# Patient Record
Sex: FEMALE | Race: WHITE | NOT HISPANIC OR LATINO | URBAN - METROPOLITAN AREA
[De-identification: names, ages, dates, MRNs, and addresses within clinical notes are randomized per-mention and may not be internally consistent; named-entity substitution may affect disease eponyms.]

---

## 2018-06-15 ENCOUNTER — OUTPATIENT (OUTPATIENT)
Dept: OUTPATIENT SERVICES | Facility: HOSPITAL | Age: 48
LOS: 1 days | Discharge: HOME | End: 2018-06-15

## 2018-06-15 ENCOUNTER — RESULT REVIEW (OUTPATIENT)
Age: 48
End: 2018-06-15

## 2018-06-27 DIAGNOSIS — Z01.419 ENCOUNTER FOR GYNECOLOGICAL EXAMINATION (GENERAL) (ROUTINE) WITHOUT ABNORMAL FINDINGS: ICD-10-CM

## 2020-03-31 PROBLEM — Z00.00 ENCOUNTER FOR PREVENTIVE HEALTH EXAMINATION: Status: ACTIVE | Noted: 2020-03-31

## 2020-04-10 ENCOUNTER — APPOINTMENT (OUTPATIENT)
Dept: OBGYN | Facility: CLINIC | Age: 50
End: 2020-04-10

## 2020-08-14 ENCOUNTER — LABORATORY RESULT (OUTPATIENT)
Age: 50
End: 2020-08-14

## 2020-08-14 ENCOUNTER — APPOINTMENT (OUTPATIENT)
Dept: OBGYN | Facility: CLINIC | Age: 50
End: 2020-08-14
Payer: COMMERCIAL

## 2020-08-14 VITALS
HEIGHT: 63 IN | WEIGHT: 130 LBS | BODY MASS INDEX: 23.04 KG/M2 | SYSTOLIC BLOOD PRESSURE: 129 MMHG | DIASTOLIC BLOOD PRESSURE: 83 MMHG

## 2020-08-14 DIAGNOSIS — F17.200 NICOTINE DEPENDENCE, UNSPECIFIED, UNCOMPLICATED: ICD-10-CM

## 2020-08-14 DIAGNOSIS — N76.0 ACUTE VAGINITIS: ICD-10-CM

## 2020-08-14 PROCEDURE — 99396 PREV VISIT EST AGE 40-64: CPT

## 2020-08-25 LAB
A VAGINAE DNA VAG QL NAA+PROBE: NORMAL
BVAB2 DNA VAG QL NAA+PROBE: ABNORMAL
C KRUSEI DNA VAG QL NAA+PROBE: NEGATIVE
C TRACH RRNA SPEC QL NAA+PROBE: NEGATIVE
HPV HIGH+LOW RISK DNA PNL CVX: DETECTED
MEGA1 DNA VAG QL NAA+PROBE: NORMAL
N GONORRHOEA RRNA SPEC QL NAA+PROBE: NEGATIVE
T VAGINALIS RRNA SPEC QL NAA+PROBE: NEGATIVE

## 2020-08-27 ENCOUNTER — TRANSCRIPTION ENCOUNTER (OUTPATIENT)
Age: 50
End: 2020-08-27

## 2020-12-23 PROBLEM — N76.0 VAGINOSIS: Status: RESOLVED | Noted: 2020-08-14 | Resolved: 2020-12-23

## 2021-12-17 ENCOUNTER — APPOINTMENT (OUTPATIENT)
Dept: OBGYN | Facility: CLINIC | Age: 51
End: 2021-12-17

## 2022-01-18 ENCOUNTER — INPATIENT (INPATIENT)
Facility: HOSPITAL | Age: 52
LOS: 1 days | Discharge: HOME | End: 2022-01-20
Attending: STUDENT IN AN ORGANIZED HEALTH CARE EDUCATION/TRAINING PROGRAM | Admitting: STUDENT IN AN ORGANIZED HEALTH CARE EDUCATION/TRAINING PROGRAM
Payer: COMMERCIAL

## 2022-01-18 VITALS
TEMPERATURE: 98 F | WEIGHT: 136.91 LBS | DIASTOLIC BLOOD PRESSURE: 80 MMHG | HEART RATE: 73 BPM | HEIGHT: 63 IN | SYSTOLIC BLOOD PRESSURE: 136 MMHG | RESPIRATION RATE: 18 BRPM | OXYGEN SATURATION: 100 %

## 2022-01-18 DIAGNOSIS — Z98.890 OTHER SPECIFIED POSTPROCEDURAL STATES: Chronic | ICD-10-CM

## 2022-01-18 LAB
ALBUMIN SERPL ELPH-MCNC: 4.4 G/DL — SIGNIFICANT CHANGE UP (ref 3.5–5.2)
ALP SERPL-CCNC: 44 U/L — SIGNIFICANT CHANGE UP (ref 30–115)
ALT FLD-CCNC: 11 U/L — SIGNIFICANT CHANGE UP (ref 0–41)
ANION GAP SERPL CALC-SCNC: 13 MMOL/L — SIGNIFICANT CHANGE UP (ref 7–14)
APTT BLD: 31.8 SEC — SIGNIFICANT CHANGE UP (ref 27–39.2)
AST SERPL-CCNC: 21 U/L — SIGNIFICANT CHANGE UP (ref 0–41)
BASOPHILS # BLD AUTO: 0.04 K/UL — SIGNIFICANT CHANGE UP (ref 0–0.2)
BASOPHILS NFR BLD AUTO: 0.8 % — SIGNIFICANT CHANGE UP (ref 0–1)
BILIRUB SERPL-MCNC: 0.3 MG/DL — SIGNIFICANT CHANGE UP (ref 0.2–1.2)
BLD GP AB SCN SERPL QL: SIGNIFICANT CHANGE UP
BUN SERPL-MCNC: 7 MG/DL — LOW (ref 10–20)
CALCIUM SERPL-MCNC: 8.9 MG/DL — SIGNIFICANT CHANGE UP (ref 8.5–10.1)
CHLORIDE SERPL-SCNC: 101 MMOL/L — SIGNIFICANT CHANGE UP (ref 98–110)
CO2 SERPL-SCNC: 23 MMOL/L — SIGNIFICANT CHANGE UP (ref 17–32)
CREAT SERPL-MCNC: 0.8 MG/DL — SIGNIFICANT CHANGE UP (ref 0.7–1.5)
EOSINOPHIL # BLD AUTO: 0.17 K/UL — SIGNIFICANT CHANGE UP (ref 0–0.7)
EOSINOPHIL NFR BLD AUTO: 3.5 % — SIGNIFICANT CHANGE UP (ref 0–8)
GLUCOSE SERPL-MCNC: 90 MG/DL — SIGNIFICANT CHANGE UP (ref 70–99)
HCG SERPL QL: NEGATIVE — SIGNIFICANT CHANGE UP
HCT VFR BLD CALC: 35.5 % — LOW (ref 37–47)
HGB BLD-MCNC: 11.7 G/DL — LOW (ref 12–16)
IMM GRANULOCYTES NFR BLD AUTO: 0.2 % — SIGNIFICANT CHANGE UP (ref 0.1–0.3)
INR BLD: 1.11 RATIO — SIGNIFICANT CHANGE UP (ref 0.65–1.3)
LIDOCAIN IGE QN: 23 U/L — SIGNIFICANT CHANGE UP (ref 7–60)
LYMPHOCYTES # BLD AUTO: 1.87 K/UL — SIGNIFICANT CHANGE UP (ref 1.2–3.4)
LYMPHOCYTES # BLD AUTO: 38.6 % — SIGNIFICANT CHANGE UP (ref 20.5–51.1)
MAGNESIUM SERPL-MCNC: 1.8 MG/DL — SIGNIFICANT CHANGE UP (ref 1.8–2.4)
MCHC RBC-ENTMCNC: 27.1 PG — SIGNIFICANT CHANGE UP (ref 27–31)
MCHC RBC-ENTMCNC: 33 G/DL — SIGNIFICANT CHANGE UP (ref 32–37)
MCV RBC AUTO: 82.4 FL — SIGNIFICANT CHANGE UP (ref 81–99)
MONOCYTES # BLD AUTO: 0.36 K/UL — SIGNIFICANT CHANGE UP (ref 0.1–0.6)
MONOCYTES NFR BLD AUTO: 7.4 % — SIGNIFICANT CHANGE UP (ref 1.7–9.3)
NEUTROPHILS # BLD AUTO: 2.39 K/UL — SIGNIFICANT CHANGE UP (ref 1.4–6.5)
NEUTROPHILS NFR BLD AUTO: 49.5 % — SIGNIFICANT CHANGE UP (ref 42.2–75.2)
NRBC # BLD: 0 /100 WBCS — SIGNIFICANT CHANGE UP (ref 0–0)
NT-PROBNP SERPL-SCNC: 47 PG/ML — SIGNIFICANT CHANGE UP (ref 0–300)
PLATELET # BLD AUTO: 293 K/UL — SIGNIFICANT CHANGE UP (ref 130–400)
POTASSIUM SERPL-MCNC: 3.7 MMOL/L — SIGNIFICANT CHANGE UP (ref 3.5–5)
POTASSIUM SERPL-SCNC: 3.7 MMOL/L — SIGNIFICANT CHANGE UP (ref 3.5–5)
PROT SERPL-MCNC: 7 G/DL — SIGNIFICANT CHANGE UP (ref 6–8)
PROTHROM AB SERPL-ACNC: 12.7 SEC — SIGNIFICANT CHANGE UP (ref 9.95–12.87)
RBC # BLD: 4.31 M/UL — SIGNIFICANT CHANGE UP (ref 4.2–5.4)
RBC # FLD: 15.7 % — HIGH (ref 11.5–14.5)
SARS-COV-2 RNA SPEC QL NAA+PROBE: SIGNIFICANT CHANGE UP
SODIUM SERPL-SCNC: 137 MMOL/L — SIGNIFICANT CHANGE UP (ref 135–146)
TROPONIN T SERPL-MCNC: 0.1 NG/ML — CRITICAL HIGH
TROPONIN T SERPL-MCNC: <0.01 NG/ML — SIGNIFICANT CHANGE UP
WBC # BLD: 4.84 K/UL — SIGNIFICANT CHANGE UP (ref 4.8–10.8)
WBC # FLD AUTO: 4.84 K/UL — SIGNIFICANT CHANGE UP (ref 4.8–10.8)

## 2022-01-18 PROCEDURE — 99291 CRITICAL CARE FIRST HOUR: CPT

## 2022-01-18 PROCEDURE — 93010 ELECTROCARDIOGRAM REPORT: CPT | Mod: 77

## 2022-01-18 PROCEDURE — 99222 1ST HOSP IP/OBS MODERATE 55: CPT

## 2022-01-18 PROCEDURE — 93010 ELECTROCARDIOGRAM REPORT: CPT | Mod: 76

## 2022-01-18 PROCEDURE — 71045 X-RAY EXAM CHEST 1 VIEW: CPT | Mod: 26

## 2022-01-18 RX ORDER — ATORVASTATIN CALCIUM 80 MG/1
80 TABLET, FILM COATED ORAL AT BEDTIME
Refills: 0 | Status: DISCONTINUED | OUTPATIENT
Start: 2022-01-18 | End: 2022-01-20

## 2022-01-18 RX ORDER — MORPHINE SULFATE 50 MG/1
2 CAPSULE, EXTENDED RELEASE ORAL ONCE
Refills: 0 | Status: DISCONTINUED | OUTPATIENT
Start: 2022-01-18 | End: 2022-01-18

## 2022-01-18 RX ORDER — ASPIRIN/CALCIUM CARB/MAGNESIUM 324 MG
81 TABLET ORAL DAILY
Refills: 0 | Status: DISCONTINUED | OUTPATIENT
Start: 2022-01-19 | End: 2022-01-20

## 2022-01-18 RX ORDER — HEPARIN SODIUM 5000 [USP'U]/ML
800 INJECTION INTRAVENOUS; SUBCUTANEOUS
Qty: 25000 | Refills: 0 | Status: DISCONTINUED | OUTPATIENT
Start: 2022-01-18 | End: 2022-01-19

## 2022-01-18 RX ORDER — HEPARIN SODIUM 5000 [USP'U]/ML
INJECTION INTRAVENOUS; SUBCUTANEOUS
Qty: 25000 | Refills: 0 | Status: DISCONTINUED | OUTPATIENT
Start: 2022-01-18 | End: 2022-01-18

## 2022-01-18 RX ORDER — ACETAMINOPHEN 500 MG
650 TABLET ORAL EVERY 6 HOURS
Refills: 0 | Status: DISCONTINUED | OUTPATIENT
Start: 2022-01-18 | End: 2022-01-20

## 2022-01-18 RX ORDER — ONDANSETRON 8 MG/1
4 TABLET, FILM COATED ORAL EVERY 8 HOURS
Refills: 0 | Status: DISCONTINUED | OUTPATIENT
Start: 2022-01-18 | End: 2022-01-20

## 2022-01-18 RX ORDER — NITROGLYCERIN 6.5 MG
5 CAPSULE, EXTENDED RELEASE ORAL
Qty: 50 | Refills: 0 | Status: DISCONTINUED | OUTPATIENT
Start: 2022-01-18 | End: 2022-01-19

## 2022-01-18 RX ORDER — ASPIRIN/CALCIUM CARB/MAGNESIUM 324 MG
324 TABLET ORAL ONCE
Refills: 0 | Status: COMPLETED | OUTPATIENT
Start: 2022-01-18 | End: 2022-01-18

## 2022-01-18 RX ORDER — LANOLIN ALCOHOL/MO/W.PET/CERES
3 CREAM (GRAM) TOPICAL AT BEDTIME
Refills: 0 | Status: DISCONTINUED | OUTPATIENT
Start: 2022-01-18 | End: 2022-01-20

## 2022-01-18 RX ORDER — CLOPIDOGREL BISULFATE 75 MG/1
300 TABLET, FILM COATED ORAL ONCE
Refills: 0 | Status: DISCONTINUED | OUTPATIENT
Start: 2022-01-18 | End: 2022-01-18

## 2022-01-18 RX ORDER — CLOPIDOGREL BISULFATE 75 MG/1
300 TABLET, FILM COATED ORAL ONCE
Refills: 0 | Status: COMPLETED | OUTPATIENT
Start: 2022-01-18 | End: 2022-01-18

## 2022-01-18 RX ADMIN — Medication 650 MILLIGRAM(S): at 21:48

## 2022-01-18 RX ADMIN — HEPARIN SODIUM 8 UNIT(S)/HR: 5000 INJECTION INTRAVENOUS; SUBCUTANEOUS at 17:38

## 2022-01-18 RX ADMIN — Medication 324 MILLIGRAM(S): at 09:12

## 2022-01-18 RX ADMIN — Medication 1.5 MICROGRAM(S)/MIN: at 21:14

## 2022-01-18 RX ADMIN — ATORVASTATIN CALCIUM 80 MILLIGRAM(S): 80 TABLET, FILM COATED ORAL at 21:48

## 2022-01-18 RX ADMIN — MORPHINE SULFATE 2 MILLIGRAM(S): 50 CAPSULE, EXTENDED RELEASE ORAL at 14:25

## 2022-01-18 RX ADMIN — Medication 1.5 MICROGRAM(S)/MIN: at 16:00

## 2022-01-18 RX ADMIN — MORPHINE SULFATE 2 MILLIGRAM(S): 50 CAPSULE, EXTENDED RELEASE ORAL at 21:14

## 2022-01-18 RX ADMIN — CLOPIDOGREL BISULFATE 300 MILLIGRAM(S): 75 TABLET, FILM COATED ORAL at 17:14

## 2022-01-18 NOTE — ED ADULT NURSE REASSESSMENT NOTE - NS ED NURSE REASSESS COMMENT FT1
Pt. en route to [facility] ED via EMS, nitro drip infusing. MDs Reyes/Randolph aware. Heparin held, pending results as per MD. Charge SHANNAN Dawn aware.

## 2022-01-18 NOTE — ED PROVIDER NOTE - CLINICAL SUMMARY MEDICAL DECISION MAKING FREE TEXT BOX
51-year-old female with past medical history of migraines presents the ER for chest pain that started this morning.  She describes pain as pressure, radiating up to her left neck and left arm. Initial EKG with concave 1mm DEUQAN in inferior leads without reciprocal changes. CP resolved with aspirin. On reassessment chest pain returned, cardiology consulted. Spoke with Dr. Adkins who recommended eval by [facility] at [place]. Patient placed on nitro, heparin drip and transferred to [facility]. 51-year-old female with past medical history of migraines presents the ER for chest pain that started this morning.  She describes pain as pressure, radiating up to her left neck and left arm. Initial EKG with concave 1mm DEQUAN in inferior leads without reciprocal changes. CP resolved with aspirin. On reassessment chest pain returned, cardiology consulted. Spoke with Dr. Adkins who recommended eval by [facility] at [place]. Nitro drip, heparin drip hanging and transferred to [facility]. 51-year-old female with past medical history of migraines presents the ER for chest pain that started this morning.  She describes pain as pressure, radiating up to her left neck and left arm. Initial EKG with concave 1mm DEQUAN in inferior leads without reciprocal changes. CP resolved with aspirin. On reassessment chest pain returned, cardiology consulted. Spoke with Dr. Adkins who recommended eval by [facility] at [place]. Nitro drip, heparin drip hanging and transferred to [facility] ER.      Patrick Fernandez Jr addendum [id number]: pt arrived at [facility] ER. seen by CCU fellow. no cp now. mild L jaw pain. on nitro gtt. ekg with no stemi. ctab, heart reg no mrg, nontoxic. given asa at [facility]. cards rec heparin gtt, nitro gtt, plavix 300mg. accepted and admitted to ccu.

## 2022-01-18 NOTE — ED PROVIDER NOTE - PROGRESS NOTE DETAILS
[facility] - EKG improving, pt has no active chest pain; will tx to [facility] for curther cardiac workup, discussed with pt, agrees AH - EKG improving, pt has no active chest pain; will tx to [facility] for further cardiac workup, discussed with pt, agrees JR: Patient reassessed at the bedside.  She endorses 3/10 chest pain, pressure-like unrelieved with aspirin.  Repeat troponin is elevated and EKG is nonischemic.  Will consult cardiology and recommend cardiac catheterization. JR: Discussed case with cardiology Dr. Adkins who agrees to consult cardio fellow at [place] given patient likely needs an urgent cardiac catheterization. AH - Initial EKG showed minimal 1mm DEQUAN in inferior leads, without reciprocal changes or TWI, rpt EKG improved, pt has no active chest pain; AH - pt was endorsing chest pressure, 3/10; rpt Trop 0.10.  Rpt EKG unchanged, no ischemic changes; Cardiology consulted; Spoke to Lucille who agrees with Cardio fellow consult for cath at [place] [facility] - spoke with Cardio Fellow, stated he accepts transfer to [place] with Aspirin, heparin  and nitro drip, no dual antiplatelet at this time; will see pt at [place] ED; pt aware, VSS AH - Nitro on standby with EMS at 5 mcg/min since pt not having active chest pain at this time;  Heparin infusion on standby, pending PTT, in lab

## 2022-01-18 NOTE — H&P ADULT - NSHPLABSRESULTS_GEN_ALL_CORE
< from: Xray Chest 1 View- PORTABLE-Urgent (01.18.22 @ 09:12) >      Impression:    No radiographic evidence of acute cardiopulmonary disease.      < end of copied text >

## 2022-01-18 NOTE — H&P ADULT - ATTENDING COMMENTS
51 year old female patient with no significant past cardiac hx presented to hospital for chest pain with EKG changes, trop was positive admitted to CCU and treated as ACS initially underwent cardiac cath earlier today showed non obstructive CAD and suspicion of AV fistula     Plan:  -Will do CCTA   -Continue ASA 81 mg daily   -will hold on plavix and heparin drip   -echo pending

## 2022-01-18 NOTE — PATIENT PROFILE ADULT - FALL HARM RISK - UNIVERSAL INTERVENTIONS
Bed in lowest position, wheels locked, appropriate side rails in place/Call bell, personal items and telephone in reach/Instruct patient to call for assistance before getting out of bed or chair/Non-slip footwear when patient is out of bed/[place] to call system/Physically safe environment - no spills, clutter or unnecessary equipment/Purposeful Proactive Rounding/Room/bathroom lighting operational, light cord in reach

## 2022-01-18 NOTE — ED PROVIDER NOTE - PHYSICAL EXAMINATION
CONSTITUTIONAL: Well-developed; well-nourished; in no acute distress, afebrile  EYES: No conjunctival injection. PERRLA. EOMI  ENT: No nasal discharge; oropharynx nonerythematous; airway clear  NECK: Supple; non tender; No JVD  CARD:  Regular rate and rhythm; S1, S2 normal; no murmurs, gallops, or rubs; no chest wall tenderness  RESP: CTAB  ABD: Soft NTND; No guarding or rebound tenderness  EXT: Normal ROM.  No clubbing or cyanosis.  No edema  NEURO: A&O x3, grossly unremarkable, no focal deficits  PSYCH: Cooperative, appropriate  *Chaperone was used during the encounter

## 2022-01-18 NOTE — ED PROVIDER NOTE - ATTENDING CONTRIBUTION TO CARE
Got 51-year-old female with past medical history of migraines presents the ER for chest pain that started this morning.  She reports describes pain as pressure, radiating up to her left neck and left arm.  She endorses her father had CAD with stents at age 44.  She denies prior smoking or work-up with a cardiologist.  Pain is moderate to severe in intensity, did not take any aspirin.    Vitals noted and within normal limits for age.    Gen - NAD, Head - NCAT, Pharynx - clear, MMM, Heart - RRR, no m/g/r, Lungs - CTAB, no w/c/r, Abdomen - soft, NT, ND, Skin - No rash, Extremities - FROM, no edema, erythema, ecchymosis, brisk cap refill, Neuro - A&O x3, equal strength and sensation, non-focal exam    a/p:  EKG shows <1mm concave DEQUAN in II, III, without reciprocal change  will give ASA, repeat EKG in 30mins  labs incl trop, CXR   reassess 51-year-old female with past medical history of migraines presents the ER for chest pain that started this morning.  She describes pain as pressure, radiating up to her left neck and left arm.  She endorses her father had CAD with stents at age 44.  She denies prior smoking or work-up with a cardiologist.  Pain is moderate to severe in intensity, did not take any aspirin.    Vitals noted and within normal limits for age.    Gen - NAD, Head - NCAT, Pharynx - clear, MMM, Heart - RRR, no m/g/r, Lungs - CTAB, no w/c/r, Abdomen - soft, NT, ND, Skin - No rash, Extremities - FROM, no edema, erythema, ecchymosis, brisk cap refill, Neuro - A&O x3, equal strength and sensation, non-focal exam    a/p:  EKG shows <1mm concave DEQUAN in II, III, without reciprocal change  will give ASA, repeat EKG in 30mins  labs incl trop, CXR   reassess 51-year-old female with past medical history of migraines presents the ER for chest pain that started this morning.  She describes pain as pressure, radiating up to her left neck and left arm. Associated with SOB at onset that resolved on arrival. She endorses her father had CAD with stents at age 44.  She denies prior smoking or work-up with a cardiologist.  Pain is moderate to severe in intensity, did not take any aspirin.    Vitals noted and within normal limits for age.    Gen - NAD, Head - NCAT, Pharynx - clear, MMM, Heart - RRR, no m/g/r, Lungs - CTAB, no w/c/r, Abdomen - soft, NT, ND, Skin - No rash, Extremities - FROM, no edema, erythema, ecchymosis, brisk cap refill, Neuro - A&O x3, equal strength and sensation, non-focal exam    a/p:  EKG shows <1mm concave DEQUAN in II, III, without reciprocal change  will give ASA, repeat EKG in 30mins  labs incl trop, CXR   reassess 51-year-old female with past medical history of migraines presents the ER for chest pain that started this morning.  She describes pain as pressure, radiating up to her left neck and left arm. Associated with SOB at onset that resolved on arrival. She endorses her father had CAD with stents at age 44.  She denies prior smoking or work-up with a cardiologist.  Pain is moderate to severe in intensity, did not take any aspirin.    Vitals noted and within normal limits for age.    Gen - NAD, Head - NCAT, Pharynx - clear, MMM, Heart - RRR, no m/g/r, Lungs - CTAB, no w/c/r, Abdomen - soft, NT, ND, Skin - No rash, Extremities - FROM, no edema, erythema, ecchymosis, brisk cap refill, Neuro - A&O x3, equal strength and sensation, non-focal exam    a/p:  EKG shows <1mm concave DEQUAN in II, III, without reciprocal change  telemetry monitoring  will give ASA, repeat EKG in 30mins  labs incl trop, CXR   reassess

## 2022-01-18 NOTE — H&P ADULT - HISTORY OF PRESENT ILLNESS
51 years old Female with PMHx of Migraines, smoker who presents with chest pain since this morning.    Patient reports that she developed acute chest pain this morning while she was getting dressed for her office. She describes it as left sided,radiating to L jaw, L arm then L upper back.  pressure in nature. She cannot tell any specific provoking factor but pain remained constant, and she called EMS that brought her to hospital. She endorses that she had epigastric pain last night that resolved on its own after an hour. She admits a strong family Hx at paternal side with her father having heart attack at age of 40.    Pt denies fevers, chills, diaphoresis, n/v/d, constipation, SOB. During the interview she denies any active chest pain (on nitro drip).     In ED, her initial VS were grossly normal.   EKG revealed sinus bradycardia ( 51 bpm), with no ST wave changes. Over the day her troponin were elevated to 0.1.  She was loaded with ASA/Plavix and started on Nitro drip for the chest pain and heparin gtt.  She is being admitted for NSTEMI and planned for Cardiac Cath in AM.

## 2022-01-18 NOTE — H&P ADULT - NSHPPHYSICALEXAM_GEN_ALL_CORE
LOS:     VITALS:   T(C): 36.8 (01-18-22 @ 12:00), Max: 36.8 (01-18-22 @ 12:00)  HR: 56 (01-18-22 @ 16:50) (50 - 73)  BP: 134/96 (01-18-22 @ 16:50) (116/63 - 154/79)  RR: 18 (01-18-22 @ 16:50) (17 - 18)  SpO2: 98% (01-18-22 @ 16:50) (97% - 100%)    GENERAL: NAD, lying in bed comfortably  HEAD:  Atraumatic, Normocephalic  EYES: EOMI, PERRLA, conjunctiva and sclera clear  ENT: Moist mucous membranes  NECK: Supple, No JVD  CHEST/LUNG: Clear to auscultation bilaterally; No rales, rhonchi, wheezing, or rubs. Unlabored respirations  HEART: Regular rate and rhythm; No murmurs, rubs, or gallops  ABDOMEN: BSx4; Soft, nontender, nondistended  EXTREMITIES:  2+ Peripheral Pulses, brisk capillary refill. No clubbing, cyanosis, or edema  NERVOUS SYSTEM:  A&Ox3, no focal deficits   SKIN: No rashes or lesions

## 2022-01-18 NOTE — CHART NOTE - NSCHARTNOTEFT_GEN_A_CORE
Code STEMI called. I responded immediately. History obtained by ED attending at [facility]. Pt 51 yr F with family history of early MI. Presenting with chest pain since am. Pain is L sided radiating to L arm. Trop 0.1. Pt is chest pain free at this time. EKG reviewed. Case discussed with interventional cardiologist. STEMI code cancelled. Pt to be transferred to [facility] ED for further eval. Load with Aspirin, place on heparin drip and nitro drip if active chest pain. Plan discussed with ED [facility] attending

## 2022-01-18 NOTE — ED PROVIDER NOTE - ST/T WAVE
minimal 1mm peterson in inferior leads but no reciprocal changes, no std improved peterson in inferior leads; no std no peterson/std minimal 1mm peterson in inferior leads without reciprocal changes, no std

## 2022-01-18 NOTE — H&P ADULT - ASSESSMENT
51 years old Female with PMHx of Migraines, smoker who presents with chest pain since this morning.    Impression :    # Nstemi (TWILA score 2 points- low risk)     Plan :     - NSTEMI, positive troponin, no EKG changes  - trend troponin, serial EKGs  - heparin gtt (PTT goal 60-80), nitro drip (titrate for chest pain)  - loaded with ASA/plavix, start statin  - Hold BB ( HR low)  - checkl A1C, lipid profile, Echo  - Admit to CCU  - NPO after MN, Cath in AM.    DVT ppx : heparin gtt  GI ppx : not needed  AAT  code full 51 years old Female with PMHx of Migraines, smoker who presents with chest pain since this morning.    Impression :    NSTEMI (TWILA score 2 points- low risk)     Plan :     - NSTEMI, positive troponin, no EKG changes  - trend troponin, serial EKGs  - loaded with ASA/plavix, start statin  - Hold BB ( HR low)  - checkl A1C, lipid profile, Echo  - Admit to CCU  - NPO after MN, Cath in AM.    DVT ppx : heparin gtt  GI ppx : not needed  AAT  code full

## 2022-01-18 NOTE — ED PROVIDER NOTE - NS ED ROS FT
Review of Systems:  CONSTITUTIONAL: No fever, No diaphoresis  SKIN: No rash  EYES: No eye pain, No blurred vision  ENT: No change in hearing, No sore throat, No neck pain, No rhinorrhea  RESPIRATORY: No shortness of breath, No cough  CARDIAC: + chest pain, No palpitations  GI: No abdominal pain, No nausea, No vomiting, No diarrhea, No constipation  MUSCULOSKELETAL: No joint paint, No swelling, + back pain  NEUROLOGIC: No numbness, No focal weakness, No headache, No dizziness  All other systems negative, unless specified in HPI

## 2022-01-18 NOTE — ED PROVIDER NOTE - OBJECTIVE STATEMENT
50 yo F with PMH Migraines who presents with chest pain since AM.  L sided, radiating to L jaw, L arm then L upper back.  pressure in nature.  Pt's father and mother with CAD in late 40s.  Pt never had cardiac testing.  Did not take any medication for relief.  Pt denies fevers, chills, diaphoresis, n/v/d, constipation, SOB.

## 2022-01-19 LAB
A1C WITH ESTIMATED AVERAGE GLUCOSE RESULT: 4.8 % — SIGNIFICANT CHANGE UP (ref 4–5.6)
ALBUMIN SERPL ELPH-MCNC: 3.5 G/DL — SIGNIFICANT CHANGE UP (ref 3.5–5.2)
ALP SERPL-CCNC: 38 U/L — SIGNIFICANT CHANGE UP (ref 30–115)
ALT FLD-CCNC: 10 U/L — SIGNIFICANT CHANGE UP (ref 0–41)
ANION GAP SERPL CALC-SCNC: 13 MMOL/L — SIGNIFICANT CHANGE UP (ref 7–14)
APTT BLD: 52 SEC — HIGH (ref 27–39.2)
APTT BLD: 58.1 SEC — HIGH (ref 27–39.2)
AST SERPL-CCNC: 23 U/L — SIGNIFICANT CHANGE UP (ref 0–41)
BASOPHILS # BLD AUTO: 0.04 K/UL — SIGNIFICANT CHANGE UP (ref 0–0.2)
BASOPHILS NFR BLD AUTO: 0.8 % — SIGNIFICANT CHANGE UP (ref 0–1)
BILIRUB SERPL-MCNC: <0.2 MG/DL — SIGNIFICANT CHANGE UP (ref 0.2–1.2)
BUN SERPL-MCNC: 14 MG/DL — SIGNIFICANT CHANGE UP (ref 10–20)
CALCIUM SERPL-MCNC: 8.6 MG/DL — SIGNIFICANT CHANGE UP (ref 8.5–10.1)
CHLORIDE SERPL-SCNC: 110 MMOL/L — SIGNIFICANT CHANGE UP (ref 98–110)
CHOLEST SERPL-MCNC: 205 MG/DL — HIGH
CK MB CFR SERPL CALC: 7.2 NG/ML — HIGH (ref 0.6–6.3)
CK SERPL-CCNC: 163 U/L — SIGNIFICANT CHANGE UP (ref 0–225)
CO2 SERPL-SCNC: 19 MMOL/L — SIGNIFICANT CHANGE UP (ref 17–32)
CREAT SERPL-MCNC: 0.8 MG/DL — SIGNIFICANT CHANGE UP (ref 0.7–1.5)
EOSINOPHIL # BLD AUTO: 0.21 K/UL — SIGNIFICANT CHANGE UP (ref 0–0.7)
EOSINOPHIL NFR BLD AUTO: 4.1 % — SIGNIFICANT CHANGE UP (ref 0–8)
ESTIMATED AVERAGE GLUCOSE: 91 MG/DL — SIGNIFICANT CHANGE UP (ref 68–114)
GLUCOSE SERPL-MCNC: 100 MG/DL — HIGH (ref 70–99)
HCT VFR BLD CALC: 32.7 % — LOW (ref 37–47)
HDLC SERPL-MCNC: 45 MG/DL — LOW
HGB BLD-MCNC: 10.7 G/DL — LOW (ref 12–16)
IMM GRANULOCYTES NFR BLD AUTO: 0.2 % — SIGNIFICANT CHANGE UP (ref 0.1–0.3)
LIPID PNL WITH DIRECT LDL SERPL: 139 MG/DL — HIGH
LYMPHOCYTES # BLD AUTO: 2.53 K/UL — SIGNIFICANT CHANGE UP (ref 1.2–3.4)
LYMPHOCYTES # BLD AUTO: 49.6 % — SIGNIFICANT CHANGE UP (ref 20.5–51.1)
MCHC RBC-ENTMCNC: 27 PG — SIGNIFICANT CHANGE UP (ref 27–31)
MCHC RBC-ENTMCNC: 32.7 G/DL — SIGNIFICANT CHANGE UP (ref 32–37)
MCV RBC AUTO: 82.4 FL — SIGNIFICANT CHANGE UP (ref 81–99)
MONOCYTES # BLD AUTO: 0.41 K/UL — SIGNIFICANT CHANGE UP (ref 0.1–0.6)
MONOCYTES NFR BLD AUTO: 8 % — SIGNIFICANT CHANGE UP (ref 1.7–9.3)
NEUTROPHILS # BLD AUTO: 1.9 K/UL — SIGNIFICANT CHANGE UP (ref 1.4–6.5)
NEUTROPHILS NFR BLD AUTO: 37.3 % — LOW (ref 42.2–75.2)
NON HDL CHOLESTEROL: 160 MG/DL — HIGH
NRBC # BLD: 0 /100 WBCS — SIGNIFICANT CHANGE UP (ref 0–0)
PLATELET # BLD AUTO: 277 K/UL — SIGNIFICANT CHANGE UP (ref 130–400)
POTASSIUM SERPL-MCNC: 4.2 MMOL/L — SIGNIFICANT CHANGE UP (ref 3.5–5)
POTASSIUM SERPL-SCNC: 4.2 MMOL/L — SIGNIFICANT CHANGE UP (ref 3.5–5)
PROT SERPL-MCNC: 5.9 G/DL — LOW (ref 6–8)
RBC # BLD: 3.97 M/UL — LOW (ref 4.2–5.4)
RBC # FLD: 15.8 % — HIGH (ref 11.5–14.5)
SODIUM SERPL-SCNC: 142 MMOL/L — SIGNIFICANT CHANGE UP (ref 135–146)
TRIGL SERPL-MCNC: 119 MG/DL — SIGNIFICANT CHANGE UP
TROPONIN T SERPL-MCNC: 0.16 NG/ML — CRITICAL HIGH
TROPONIN T SERPL-MCNC: 0.28 NG/ML — CRITICAL HIGH
WBC # BLD: 5.1 K/UL — SIGNIFICANT CHANGE UP (ref 4.8–10.8)
WBC # FLD AUTO: 5.1 K/UL — SIGNIFICANT CHANGE UP (ref 4.8–10.8)

## 2022-01-19 PROCEDURE — 99232 SBSQ HOSP IP/OBS MODERATE 35: CPT | Mod: GC

## 2022-01-19 PROCEDURE — 93458 L HRT ARTERY/VENTRICLE ANGIO: CPT | Mod: 26

## 2022-01-19 PROCEDURE — 93010 ELECTROCARDIOGRAM REPORT: CPT

## 2022-01-19 RX ORDER — CHLORHEXIDINE GLUCONATE 213 G/1000ML
1 SOLUTION TOPICAL
Refills: 0 | Status: DISCONTINUED | OUTPATIENT
Start: 2022-01-19 | End: 2022-01-20

## 2022-01-19 RX ORDER — SODIUM CHLORIDE 9 MG/ML
1000 INJECTION INTRAMUSCULAR; INTRAVENOUS; SUBCUTANEOUS
Refills: 0 | Status: DISCONTINUED | OUTPATIENT
Start: 2022-01-19 | End: 2022-01-20

## 2022-01-19 RX ORDER — CLOPIDOGREL BISULFATE 75 MG/1
75 TABLET, FILM COATED ORAL DAILY
Refills: 0 | Status: DISCONTINUED | OUTPATIENT
Start: 2022-01-19 | End: 2022-01-19

## 2022-01-19 RX ADMIN — SODIUM CHLORIDE 75 MILLILITER(S): 9 INJECTION INTRAMUSCULAR; INTRAVENOUS; SUBCUTANEOUS at 14:01

## 2022-01-19 RX ADMIN — ATORVASTATIN CALCIUM 80 MILLIGRAM(S): 80 TABLET, FILM COATED ORAL at 21:31

## 2022-01-19 RX ADMIN — CLOPIDOGREL BISULFATE 75 MILLIGRAM(S): 75 TABLET, FILM COATED ORAL at 07:31

## 2022-01-19 RX ADMIN — Medication 81 MILLIGRAM(S): at 07:31

## 2022-01-19 RX ADMIN — Medication 650 MILLIGRAM(S): at 05:41

## 2022-01-19 RX ADMIN — SODIUM CHLORIDE 150 MILLILITER(S): 9 INJECTION INTRAMUSCULAR; INTRAVENOUS; SUBCUTANEOUS at 10:52

## 2022-01-19 NOTE — CHART NOTE - NSCHARTNOTEFT_GEN_A_CORE
PRE-OP DIAGNOSIS: clinical presentation c/w NSTEMI    PROCEDURE:   [x] Coronary Angiogram     [x] LHC     [] LVG     [] RHC     [] Intervention (see below)      PHYSICIAN: Dr. Camarena    FELLOW: Dr. Sanabria, Dr. De La Rosa      PROCEDURE DESCRIPTION:       Consent:      [X] Patient     [] Family Member     []  Used        Anesthesia:     [] General     [X] Sedation     [] Local        Access & Closure:     [x] 6Fr Radial Artery --> D-STAT    [] Fr Femoral Artery     [] Fr Femoral Vein     [] Fr Brachial Vein     IV Contrast: 30    FINDINGS:   Coronary Dominance: right    LM: no disease    LAD: no disease   Diag: no disease    CX: prox luminal irregularities; distal circ no disease, supplying undefined vascular structure ? fistula   OM: large vessel, no disease    RCA: no disease  RPDA: no disease  RPL: no disease, supplying undefined vascular structure ? 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 Camarena    FELLOW: Dr. Sanabria, Dr. De La Rosa      PROCEDURE DESCRIPTION:       Consent:      [X] Patient     [] Family Member     []  Used        Anesthesia:     [] General     [X] Sedation     [] Local        Access & Closure:     [x] 6Fr Radial Artery --> D-STAT    [] Fr Femoral Artery     [] Fr Femoral Vein     [] Fr Brachial Vein     IV Contrast: 30    FINDINGS:   Coronary Dominance: right    LM: no disease    LAD: no disease   Diag: no disease    CX: prox luminal irregularities; distal circ no disease, supplying undefined vascular structure ? fistula   OM: large vessel, no disease    RCA: no disease  RPDA: no disease  RPL: no disease, supplying undefined vascular structure ? 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 Camarena    FELLOW: Dr. Sanabria, Dr. De La Rosa      PROCEDURE DESCRIPTION:       Consent:      [X] Patient     [] Family Member     []  Used        Anesthesia:     [] General     [X] Sedation     [] Local        Access & Closure:     [x] 6Fr Radial Artery --> D-STAT    [] Fr Femoral Artery     [] Fr Femoral Vein     [] Fr Brachial Vein     IV Contrast: 30    FINDINGS:   Coronary Dominance: right    LM: no disease    LAD: no disease   Diag: no disease    CX: prox luminal irregularities; distal circ no disease, supplying undefined vascular structure ? fistula   OM: large vessel, no disease    RCA: no disease  RPDA: no disease  RPL: no disease, supplying undefined vascular structure ? fistula    LVEDP: 7    ESTIMATED BLOOD LOSS: < 10 mL        CONDITION:     [X] Good     [] Fair     [] Critical      POST-OP DIAGNOSIS:      [x] non-obstructive CAD    [x] undefined vascular structure concerning for fistula     PLAN OF CARE:     [x] Return to In-patient bed    [x] Aggressive medical management; stop plavix and heparin gtt    [x] IV Fluids: NS at 100cc/hr    [x] Obtain CCTA to evaluate for possible fistula    Jonny De La Rosa PGY4

## 2022-01-19 NOTE — PROGRESS NOTE ADULT - SUBJECTIVE AND OBJECTIVE BOX
PATIENT:  STEFFANY BOGGS  [id number]    CHIEF COMPLAINT:  Patient is a 51y old  Female who presents with a chief complaint of chest pain (18 Jan 2022 17:40)    Patient reports that she developed acute chest pain this morning while she was getting dressed for her office. She describes it as left sided,radiating to L jaw, L arm then L upper back.  pressure in nature. She cannot tell any specific provoking factor but pain remained constant, and she called EMS that brought her to hospital. She endorses that she had epigastric pain last night that resolved on its own after an hour. She admits a strong family Hx at paternal side with her father having heart attack at age of 40.    Pt denies fevers, chills, diaphoresis, n/v/d, constipation, SOB. During the interview she denies any active chest pain (on nitro drip).     In ED, her initial VS were grossly normal.   EKG revealed sinus bradycardia ( 51 bpm), with no ST wave changes. Over the day her troponin were elevated to 0.1.  She was loaded with ASA/Plavix and started on Nitro drip for the chest pain and heparin gtt.  She is being admitted for NSTEMI and planned for Cardiac Cath in AM.       INTERVAL HISTORY/OVERNIGHT EVENTS:      REVIEW OF SYSTEMS:    Constitutional:     [ ] negative [ ] fevers [ ] chills [ ] weight loss [ ] weight gain  HEENT:                  [ ] negative [ ] dry eyes [ ] eye irritation [ ] postnasal drip [ ] nasal congestion  CV:                         [ ] negative  [ ] chest pain [ ] orthopnea [ ] palpitations [ ] murmur  Resp:                     [ ] negative [ ] cough [ ] shortness of breath [ ] dyspnea [ ] wheezing [ ] sputum [ ] hemoptysis  GI:                          [ ] negative [ ] nausea [ ] vomiting [ ] diarrhea [ ] constipation [ ] abd pain [ ] dysphagia   :                        [ ] negative [ ] dysuria [ ] nocturia [ ] hematuria [ ] increased urinary frequency  Musculoskeletal: [ ] negative [ ] back pain [ ] myalgias [ ] arthralgias [ ] fracture  Skin:                       [ ] negative [ ] rash [ ] itch  Neurological:        [ ] negative [ ] headache [ ] dizziness [ ] syncope [ ] weakness [ ] numbness  Psychiatric:           [ ] negative [ ] anxiety [ ] depression  Endocrine:            [ ] negative [ ] diabetes [ ] thyroid problem  Heme/Lymph:      [ ] negative [ ] anemia [ ] bleeding problem  Allergic/Immune: [ ] negative [ ] itchy eyes [ ] nasal discharge [ ] hives [ ] angioedema    [ ] All other systems negative  [ ] Unable to assess ROS because ________.    MEDICATIONS:  MEDICATIONS  (STANDING):  aspirin enteric coated 81 milliGRAM(s) Oral daily  atorvastatin 80 milliGRAM(s) Oral at bedtime  chlorhexidine 4% Liquid 1 Application(s) Topical <User Schedule>    MEDICATIONS  (PRN):  acetaminophen     Tablet .. 650 milliGRAM(s) Oral every 6 hours PRN Temp greater or equal to 38C (100.4F), Mild Pain (1 - 3)  aluminum hydroxide/magnesium hydroxide/simethicone Suspension 30 milliLiter(s) Oral every 4 hours PRN Dyspepsia  melatonin 3 milliGRAM(s) Oral at bedtime PRN Insomnia  ondansetron Injectable 4 milliGRAM(s) IV Push every 8 hours PRN Nausea and/or Vomiting      ALLERGIES:  Allergies    No Known Allergies    Intolerances        OBJECTIVE:  ICU Vital Signs Last 24 Hrs  T(C): 36.6 (19 Jan 2022 06:00), Max: 36.8 (18 Jan 2022 12:00)  T(F): 97.8 (19 Jan 2022 06:00), Max: 98.3 (18 Jan 2022 12:00)  HR: 50 (19 Jan 2022 06:00) (50 - 73)  BP: 103/59 (19 Jan 2022 06:00) (89/53 - 154/79)  BP(mean): 78 (19 Jan 2022 06:00) (65 - 84)  ABP: --  ABP(mean): --  RR: 17 (19 Jan 2022 06:00) (15 - 32)  SpO2: 94% (19 Jan 2022 06:00) (92% - 100%)      Adult Advanced Hemodynamics Last 24 Hrs  CVP(mm Hg): --  CVP(cm H2O): --  CO: --  CI: --  PA: --  PA(mean): --  PCWP: --  SVR: --  SVRI: --  PVR: --  PVRI: --  CAPILLARY BLOOD GLUCOSE        CAPILLARY BLOOD GLUCOSE        I&O's Summary    18 Jan 2022 07:01  -  19 Jan 2022 07:00  --------------------------------------------------------  IN: 94 mL / OUT: 500 mL / NET: -406 mL      Daily Height in cm: 160.02 (18 Jan 2022 21:04)    Daily     PHYSICAL EXAMINATION:  ***Pending, pt is not in room***    Tubes:    LABS:                          10.7   5.10  )-----------( 277      ( 19 Jan 2022 05:30 )             32.7     01-19    142  |  110  |  14  ----------------------------<  100<H>  4.2   |  19  |  0.8    Ca    8.6      19 Jan 2022 05:30  Mg     1.8     01-18    TPro  5.9<L>  /  Alb  3.5  /  TBili  <0.2  /  DBili  x   /  AST  23  /  ALT  10  /  AlkPhos  38  01-19    LIVER FUNCTIONS - ( 19 Jan 2022 05:30 )  Alb: 3.5 g/dL / Pro: 5.9 g/dL / ALK PHOS: 38 U/L / ALT: 10 U/L / AST: 23 U/L / GGT: x           PT/INR - ( 18 Jan 2022 15:38 )   PT: 12.70 sec;   INR: 1.11 ratio         PTT - ( 19 Jan 2022 05:30 )  PTT:58.1 sec  Creatine Kinase, Serum: 163 U/L (01-19 @ 05:30)  CKMB Units: 7.2 ng/mL (01-19 @ 05:30)  Troponin T, Serum: 0.16 ng/mL (01-19 @ 05:30)  Troponin T, Serum: 0.28 ng/mL (01-19 @ 01:00)  Troponin T, Serum: 0.10 ng/mL (01-18 @ 13:16)  Troponin T, Serum: <0.01 ng/mL (01-18 @ 08:45)    CARDIAC MARKERS ( 19 Jan 2022 05:30 )  x     / 0.16 ng/mL / 163 U/L / x     / 7.2 ng/mL  CARDIAC MARKERS ( 19 Jan 2022 01:00 )  x     / 0.28 ng/mL / x     / x     / x      CARDIAC MARKERS ( 18 Jan 2022 13:16 )  x     / 0.10 ng/mL / x     / x     / x      CARDIAC MARKERS ( 18 Jan 2022 08:45 )  x     / <0.01 ng/mL / x     / x     / x              TELEMETRY:     EKG:     IMAGING:           PATIENT:  STEFFANY BOGGS  [id number]    CHIEF COMPLAINT:  Patient is a 51y old  Female who presents with a chief complaint of chest pain (18 Jan 2022 17:40)    Patient reports that she developed acute chest pain this morning while she was getting dressed for her office. She describes it as left sided,radiating to L jaw, L arm then L upper back.  pressure in nature. She cannot tell any specific provoking factor but pain remained constant, and she called EMS that brought her to hospital. She endorses that she had epigastric pain last night that resolved on its own after an hour. She admits a strong family Hx at paternal side with her father having heart attack at age of 40.    Pt denies fevers, chills, diaphoresis, n/v/d, constipation, SOB. During the interview she denies any active chest pain (on nitro drip).     In ED, her initial VS were grossly normal.   EKG revealed sinus bradycardia ( 51 bpm), with no ST wave changes. Over the day her troponin were elevated to 0.1.  She was loaded with ASA/Plavix and started on Nitro drip for the chest pain and heparin gtt.  She is being admitted for NSTEMI and planned for Cardiac Cath in AM.       INTERVAL HISTORY/OVERNIGHT EVENTS:      REVIEW OF SYSTEMS:    Constitutional:     [ ] negative [ ] fevers [ ] chills [ ] weight loss [ ] weight gain  HEENT:                  [ ] negative [ ] dry eyes [ ] eye irritation [ ] postnasal drip [ ] nasal congestion  CV:                         [ ] negative  [ ] chest pain [ ] orthopnea [ ] palpitations [ ] murmur  Resp:                     [ ] negative [ ] cough [ ] shortness of breath [ ] dyspnea [ ] wheezing [ ] sputum [ ] hemoptysis  GI:                          [ ] negative [ ] nausea [ ] vomiting [ ] diarrhea [ ] constipation [ ] abd pain [ ] dysphagia   :                        [ ] negative [ ] dysuria [ ] nocturia [ ] hematuria [ ] increased urinary frequency  Musculoskeletal: [ ] negative [ ] back pain [ ] myalgias [ ] arthralgias [ ] fracture  Skin:                       [ ] negative [ ] rash [ ] itch  Neurological:        [ ] negative [ ] headache [ ] dizziness [ ] syncope [ ] weakness [ ] numbness  Psychiatric:           [ ] negative [ ] anxiety [ ] depression  Endocrine:            [ ] negative [ ] diabetes [ ] thyroid problem  Heme/Lymph:      [ ] negative [ ] anemia [ ] bleeding problem  Allergic/Immune: [ ] negative [ ] itchy eyes [ ] nasal discharge [ ] hives [ ] angioedema    [ ] All other systems negative  [ ] Unable to assess ROS because ________.    MEDICATIONS:  MEDICATIONS  (STANDING):  aspirin enteric coated 81 milliGRAM(s) Oral daily  atorvastatin 80 milliGRAM(s) Oral at bedtime  chlorhexidine 4% Liquid 1 Application(s) Topical <User Schedule>    MEDICATIONS  (PRN):  acetaminophen     Tablet .. 650 milliGRAM(s) Oral every 6 hours PRN Temp greater or equal to 38C (100.4F), Mild Pain (1 - 3)  aluminum hydroxide/magnesium hydroxide/simethicone Suspension 30 milliLiter(s) Oral every 4 hours PRN Dyspepsia  melatonin 3 milliGRAM(s) Oral at bedtime PRN Insomnia  ondansetron Injectable 4 milliGRAM(s) IV Push every 8 hours PRN Nausea and/or Vomiting      ALLERGIES:  Allergies    No Known Allergies    Intolerances        OBJECTIVE:  ICU Vital Signs Last 24 Hrs  T(C): 36.6 (19 Jan 2022 06:00), Max: 36.8 (18 Jan 2022 12:00)  T(F): 97.8 (19 Jan 2022 06:00), Max: 98.3 (18 Jan 2022 12:00)  HR: 50 (19 Jan 2022 06:00) (50 - 73)  BP: 103/59 (19 Jan 2022 06:00) (89/53 - 154/79)  BP(mean): 78 (19 Jan 2022 06:00) (65 - 84)  ABP: --  ABP(mean): --  RR: 17 (19 Jan 2022 06:00) (15 - 32)  SpO2: 94% (19 Jan 2022 06:00) (92% - 100%)      Adult Advanced Hemodynamics Last 24 Hrs  CVP(mm Hg): --  CVP(cm H2O): --  CO: --  CI: --  PA: --  PA(mean): --  PCWP: --  SVR: --  SVRI: --  PVR: --  PVRI: --  CAPILLARY BLOOD GLUCOSE        CAPILLARY BLOOD GLUCOSE        I&O's Summary    18 Jan 2022 07:01  -  19 Jan 2022 07:00  --------------------------------------------------------  IN: 94 mL / OUT: 500 mL / NET: -406 mL      Daily Height in cm: 160.02 (18 Jan 2022 21:04)    Daily     PHYSICAL EXAMINATION:  ***Pending, pt is not in room***    Tubes:    LABS:                          10.7   5.10  )-----------( 277      ( 19 Jan 2022 05:30 )             32.7     01-19    142  |  110  |  14  ----------------------------<  100<H>  4.2   |  19  |  0.8    Ca    8.6      19 Jan 2022 05:30  Mg     1.8     01-18    TPro  5.9<L>  /  Alb  3.5  /  TBili  <0.2  /  DBili  x   /  AST  23  /  ALT  10  /  AlkPhos  38  01-19    LIVER FUNCTIONS - ( 19 Jan 2022 05:30 )  Alb: 3.5 g/dL / Pro: 5.9 g/dL / ALK PHOS: 38 U/L / ALT: 10 U/L / AST: 23 U/L / GGT: x           PT/INR - ( 18 Jan 2022 15:38 )   PT: 12.70 sec;   INR: 1.11 ratio         PTT - ( 19 Jan 2022 05:30 )  PTT:58.1 sec  Creatine Kinase, Serum: 163 U/L (01-19 @ 05:30)  CKMB Units: 7.2 ng/mL (01-19 @ 05:30)  Troponin T, Serum: 0.16 ng/mL (01-19 @ 05:30)  Troponin T, Serum: 0.28 ng/mL (01-19 @ 01:00)  Troponin T, Serum: 0.10 ng/mL (01-18 @ 13:16)  Troponin T, Serum: <0.01 ng/mL (01-18 @ 08:45)    CARDIAC MARKERS ( 19 Jan 2022 05:30 )  x     / 0.16 ng/mL / 163 U/L / x     / 7.2 ng/mL  CARDIAC MARKERS ( 19 Jan 2022 01:00 )  x     / 0.28 ng/mL / x     / x     / x      CARDIAC MARKERS ( 18 Jan 2022 13:16 )  x     / 0.10 ng/mL / x     / x     / x      CARDIAC MARKERS ( 18 Jan 2022 08:45 )  x     / <0.01 ng/mL / x     / x     / x                   PATIENT:  STEFFANY BOGGS  [id number]    CHIEF COMPLAINT:  Patient is a 51y old  Female who presents with a chief complaint of chest pain (18 Jan 2022 17:40)    Patient reports that she developed acute chest pain this morning while she was getting dressed for her office. She describes it as left sided,radiating to L jaw, L arm then L upper back.  pressure in nature. She cannot tell any specific provoking factor but pain remained constant, and she called EMS that brought her to hospital. She endorses that she had epigastric pain last night that resolved on its own after an hour. She admits a strong family Hx at paternal side with her father having heart attack at age of 40.    Pt denies fevers, chills, diaphoresis, n/v/d, constipation, SOB. During the interview she denies any active chest pain (on nitro drip).     In ED, her initial VS were grossly normal.   EKG revealed sinus bradycardia ( 51 bpm), with no ST wave changes. Over the day her troponin were elevated to 0.1.  She was loaded with ASA/Plavix and started on Nitro drip for the chest pain and heparin gtt.  She is being admitted for NSTEMI and planned for Cardiac Cath in AM.       INTERVAL HISTORY/OVERNIGHT EVENTS:      REVIEW OF SYSTEMS:    Constitutional:     [ ] negative [ ] fevers [ ] chills [ ] weight loss [ ] weight gain  HEENT:                  [ ] negative [ ] dry eyes [ ] eye irritation [ ] postnasal drip [ ] nasal congestion  CV:                         [ ] negative  [ ] chest pain [ ] orthopnea [ ] palpitations [ ] murmur  Resp:                     [ ] negative [ ] cough [ ] shortness of breath [ ] dyspnea [ ] wheezing [ ] sputum [ ] hemoptysis  GI:                          [ ] negative [ ] nausea [ ] vomiting [ ] diarrhea [ ] constipation [ ] abd pain [ ] dysphagia   :                        [ ] negative [ ] dysuria [ ] nocturia [ ] hematuria [ ] increased urinary frequency  Musculoskeletal: [ ] negative [ ] back pain [ ] myalgias [ ] arthralgias [ ] fracture  Skin:                       [ ] negative [ ] rash [ ] itch  Neurological:        [ ] negative [ ] headache [ ] dizziness [ ] syncope [ ] weakness [ ] numbness  Psychiatric:           [ ] negative [ ] anxiety [ ] depression  Endocrine:            [ ] negative [ ] diabetes [ ] thyroid problem  Heme/Lymph:      [ ] negative [ ] anemia [ ] bleeding problem  Allergic/Immune: [ ] negative [ ] itchy eyes [ ] nasal discharge [ ] hives [ ] angioedema    [x ] All other systems negative  [ ] Unable to assess ROS because ________.    MEDICATIONS:  MEDICATIONS  (STANDING):  aspirin enteric coated 81 milliGRAM(s) Oral daily  atorvastatin 80 milliGRAM(s) Oral at bedtime  chlorhexidine 4% Liquid 1 Application(s) Topical <User Schedule>    MEDICATIONS  (PRN):  acetaminophen     Tablet .. 650 milliGRAM(s) Oral every 6 hours PRN Temp greater or equal to 38C (100.4F), Mild Pain (1 - 3)  aluminum hydroxide/magnesium hydroxide/simethicone Suspension 30 milliLiter(s) Oral every 4 hours PRN Dyspepsia  melatonin 3 milliGRAM(s) Oral at bedtime PRN Insomnia  ondansetron Injectable 4 milliGRAM(s) IV Push every 8 hours PRN Nausea and/or Vomiting      ALLERGIES:  Allergies    No Known Allergies    Intolerances        OBJECTIVE:  ICU Vital Signs Last 24 Hrs  T(C): 36.6 (19 Jan 2022 06:00), Max: 36.8 (18 Jan 2022 12:00)  T(F): 97.8 (19 Jan 2022 06:00), Max: 98.3 (18 Jan 2022 12:00)  HR: 50 (19 Jan 2022 06:00) (50 - 73)  BP: 103/59 (19 Jan 2022 06:00) (89/53 - 154/79)  BP(mean): 78 (19 Jan 2022 06:00) (65 - 84)  ABP: --  ABP(mean): --  RR: 17 (19 Jan 2022 06:00) (15 - 32)  SpO2: 94% (19 Jan 2022 06:00) (92% - 100%)      Adult Advanced Hemodynamics Last 24 Hrs  CVP(mm Hg): --  CVP(cm H2O): --  CO: --  CI: --  PA: --  PA(mean): --  PCWP: --  SVR: --  SVRI: --  PVR: --  PVRI: --  CAPILLARY BLOOD GLUCOSE        CAPILLARY BLOOD GLUCOSE        I&O's Summary    18 Jan 2022 07:01  -  19 Jan 2022 07:00  --------------------------------------------------------  IN: 94 mL / OUT: 500 mL / NET: -406 mL      Daily Height in cm: 160.02 (18 Jan 2022 21:04)    Daily     PHYSICAL EXAMINATION:  Constitutional: pt is comfortable in bed; no acute distress; well-groomed  HEENT: Full ROM in bilateral eyes; pupils symmetrical and equal in size; neck supple  Respiratory: (+) sounds in all lung fields; no crackles or wheezes appreciated  Cardiovascular: S1 S2 regular rate and rhythm; no murmurs or gallops appreciated  Gastrointestinal: (+) bowel sounds in all quadrants; abdomen is non-distended, non-tender to superficial and deep palpation  Extremities: extremities are non-edematous  Vascular: Warm and Well perfused UE and LE; no mottling or dusky skin   Neurological: AxO x3 to self, place and year  Skin: no raches or ulcerations noted; no scaling present    Tubes:    LABS:                          10.7   5.10  )-----------( 277      ( 19 Jan 2022 05:30 )             32.7     01-19    142  |  110  |  14  ----------------------------<  100<H>  4.2   |  19  |  0.8    Ca    8.6      19 Jan 2022 05:30  Mg     1.8     01-18    TPro  5.9<L>  /  Alb  3.5  /  TBili  <0.2  /  DBili  x   /  AST  23  /  ALT  10  /  AlkPhos  38  01-19    LIVER FUNCTIONS - ( 19 Jan 2022 05:30 )  Alb: 3.5 g/dL / Pro: 5.9 g/dL / ALK PHOS: 38 U/L / ALT: 10 U/L / AST: 23 U/L / GGT: x           PT/INR - ( 18 Jan 2022 15:38 )   PT: 12.70 sec;   INR: 1.11 ratio         PTT - ( 19 Jan 2022 05:30 )  PTT:58.1 sec  Creatine Kinase, Serum: 163 U/L (01-19 @ 05:30)  CKMB Units: 7.2 ng/mL (01-19 @ 05:30)  Troponin T, Serum: 0.16 ng/mL (01-19 @ 05:30)  Troponin T, Serum: 0.28 ng/mL (01-19 @ 01:00)  Troponin T, Serum: 0.10 ng/mL (01-18 @ 13:16)  Troponin T, Serum: <0.01 ng/mL (01-18 @ 08:45)    CARDIAC MARKERS ( 19 Jan 2022 05:30 )  x     / 0.16 ng/mL / 163 U/L / x     / 7.2 ng/mL  CARDIAC MARKERS ( 19 Jan 2022 01:00 )  x     / 0.28 ng/mL / x     / x     / x      CARDIAC MARKERS ( 18 Jan 2022 13:16 )  x     / 0.10 ng/mL / x     / x     / x      CARDIAC MARKERS ( 18 Jan 2022 08:45 )  x     / <0.01 ng/mL / x     / x     / x

## 2022-01-20 ENCOUNTER — TRANSCRIPTION ENCOUNTER (OUTPATIENT)
Age: 52
End: 2022-01-20

## 2022-01-20 VITALS
SYSTOLIC BLOOD PRESSURE: 124 MMHG | TEMPERATURE: 99 F | HEART RATE: 64 BPM | RESPIRATION RATE: 18 BRPM | DIASTOLIC BLOOD PRESSURE: 67 MMHG

## 2022-01-20 LAB
ALBUMIN SERPL ELPH-MCNC: 3.7 G/DL — SIGNIFICANT CHANGE UP (ref 3.5–5.2)
ALP SERPL-CCNC: 37 U/L — SIGNIFICANT CHANGE UP (ref 30–115)
ALT FLD-CCNC: 9 U/L — SIGNIFICANT CHANGE UP (ref 0–41)
ANION GAP SERPL CALC-SCNC: 13 MMOL/L — SIGNIFICANT CHANGE UP (ref 7–14)
APTT BLD: 30.2 SEC — SIGNIFICANT CHANGE UP (ref 27–39.2)
AST SERPL-CCNC: 17 U/L — SIGNIFICANT CHANGE UP (ref 0–41)
BASOPHILS # BLD AUTO: 0.03 K/UL — SIGNIFICANT CHANGE UP (ref 0–0.2)
BASOPHILS NFR BLD AUTO: 0.5 % — SIGNIFICANT CHANGE UP (ref 0–1)
BILIRUB SERPL-MCNC: <0.2 MG/DL — SIGNIFICANT CHANGE UP (ref 0.2–1.2)
BUN SERPL-MCNC: 9 MG/DL — LOW (ref 10–20)
CALCIUM SERPL-MCNC: 8.7 MG/DL — SIGNIFICANT CHANGE UP (ref 8.5–10.1)
CHLORIDE SERPL-SCNC: 107 MMOL/L — SIGNIFICANT CHANGE UP (ref 98–110)
CO2 SERPL-SCNC: 18 MMOL/L — SIGNIFICANT CHANGE UP (ref 17–32)
CREAT SERPL-MCNC: 0.6 MG/DL — LOW (ref 0.7–1.5)
EOSINOPHIL # BLD AUTO: 0.22 K/UL — SIGNIFICANT CHANGE UP (ref 0–0.7)
EOSINOPHIL NFR BLD AUTO: 4 % — SIGNIFICANT CHANGE UP (ref 0–8)
GLUCOSE SERPL-MCNC: 96 MG/DL — SIGNIFICANT CHANGE UP (ref 70–99)
HCT VFR BLD CALC: 31.8 % — LOW (ref 37–47)
HGB BLD-MCNC: 10.4 G/DL — LOW (ref 12–16)
IMM GRANULOCYTES NFR BLD AUTO: 0.2 % — SIGNIFICANT CHANGE UP (ref 0.1–0.3)
INR BLD: 1 RATIO — SIGNIFICANT CHANGE UP (ref 0.65–1.3)
LYMPHOCYTES # BLD AUTO: 1.65 K/UL — SIGNIFICANT CHANGE UP (ref 1.2–3.4)
LYMPHOCYTES # BLD AUTO: 30.1 % — SIGNIFICANT CHANGE UP (ref 20.5–51.1)
MAGNESIUM SERPL-MCNC: 1.7 MG/DL — LOW (ref 1.8–2.4)
MCHC RBC-ENTMCNC: 26.9 PG — LOW (ref 27–31)
MCHC RBC-ENTMCNC: 32.7 G/DL — SIGNIFICANT CHANGE UP (ref 32–37)
MCV RBC AUTO: 82.4 FL — SIGNIFICANT CHANGE UP (ref 81–99)
MONOCYTES # BLD AUTO: 0.34 K/UL — SIGNIFICANT CHANGE UP (ref 0.1–0.6)
MONOCYTES NFR BLD AUTO: 6.2 % — SIGNIFICANT CHANGE UP (ref 1.7–9.3)
NEUTROPHILS # BLD AUTO: 3.24 K/UL — SIGNIFICANT CHANGE UP (ref 1.4–6.5)
NEUTROPHILS NFR BLD AUTO: 59 % — SIGNIFICANT CHANGE UP (ref 42.2–75.2)
NRBC # BLD: 0 /100 WBCS — SIGNIFICANT CHANGE UP (ref 0–0)
PLATELET # BLD AUTO: 265 K/UL — SIGNIFICANT CHANGE UP (ref 130–400)
POTASSIUM SERPL-MCNC: 4 MMOL/L — SIGNIFICANT CHANGE UP (ref 3.5–5)
POTASSIUM SERPL-SCNC: 4 MMOL/L — SIGNIFICANT CHANGE UP (ref 3.5–5)
PROT SERPL-MCNC: 5.9 G/DL — LOW (ref 6–8)
PROTHROM AB SERPL-ACNC: 11.5 SEC — SIGNIFICANT CHANGE UP (ref 9.95–12.87)
RBC # BLD: 3.86 M/UL — LOW (ref 4.2–5.4)
RBC # FLD: 15.8 % — HIGH (ref 11.5–14.5)
SODIUM SERPL-SCNC: 138 MMOL/L — SIGNIFICANT CHANGE UP (ref 135–146)
WBC # BLD: 5.49 K/UL — SIGNIFICANT CHANGE UP (ref 4.8–10.8)
WBC # FLD AUTO: 5.49 K/UL — SIGNIFICANT CHANGE UP (ref 4.8–10.8)

## 2022-01-20 PROCEDURE — 93010 ELECTROCARDIOGRAM REPORT: CPT

## 2022-01-20 PROCEDURE — 93306 TTE W/DOPPLER COMPLETE: CPT | Mod: 26

## 2022-01-20 PROCEDURE — 75572 CT HRT W/3D IMAGE: CPT | Mod: 26

## 2022-01-20 PROCEDURE — 99238 HOSP IP/OBS DSCHRG MGMT 30/<: CPT | Mod: GC

## 2022-01-20 RX ORDER — MAGNESIUM SULFATE 500 MG/ML
2 VIAL (ML) INJECTION
Refills: 0 | Status: COMPLETED | OUTPATIENT
Start: 2022-01-20 | End: 2022-01-20

## 2022-01-20 RX ORDER — SODIUM CHLORIDE 9 MG/ML
1000 INJECTION, SOLUTION INTRAVENOUS
Refills: 0 | Status: DISCONTINUED | OUTPATIENT
Start: 2022-01-20 | End: 2022-01-20

## 2022-01-20 RX ORDER — ATORVASTATIN CALCIUM 80 MG/1
1 TABLET, FILM COATED ORAL
Qty: 30 | Refills: 0
Start: 2022-01-20 | End: 2022-02-18

## 2022-01-20 RX ORDER — ASPIRIN/CALCIUM CARB/MAGNESIUM 324 MG
1 TABLET ORAL
Qty: 30 | Refills: 0
Start: 2022-01-20 | End: 2022-02-18

## 2022-01-20 RX ORDER — HEPARIN SODIUM 5000 [USP'U]/ML
5000 INJECTION INTRAVENOUS; SUBCUTANEOUS EVERY 12 HOURS
Refills: 0 | Status: DISCONTINUED | OUTPATIENT
Start: 2022-01-20 | End: 2022-01-20

## 2022-01-20 RX ORDER — ENOXAPARIN SODIUM 100 MG/ML
40 INJECTION SUBCUTANEOUS DAILY
Refills: 0 | Status: DISCONTINUED | OUTPATIENT
Start: 2022-01-20 | End: 2022-01-20

## 2022-01-20 RX ADMIN — SODIUM CHLORIDE 50 MILLILITER(S): 9 INJECTION, SOLUTION INTRAVENOUS at 09:34

## 2022-01-20 RX ADMIN — Medication 25 GRAM(S): at 11:24

## 2022-01-20 RX ADMIN — Medication 650 MILLIGRAM(S): at 09:34

## 2022-01-20 RX ADMIN — Medication 81 MILLIGRAM(S): at 11:24

## 2022-01-20 RX ADMIN — Medication 650 MILLIGRAM(S): at 10:04

## 2022-01-20 RX ADMIN — CHLORHEXIDINE GLUCONATE 1 APPLICATION(S): 213 SOLUTION TOPICAL at 05:34

## 2022-01-20 RX ADMIN — Medication 25 GRAM(S): at 09:34

## 2022-01-20 NOTE — DISCHARGE NOTE PROVIDER - CARE PROVIDER_API CALL
Celio Osorio (DO)  Internal Medicine  [address] [place] [address]  Phone: [phone]  Fax: [phone]  Follow Up Time: 2 weeks    Abhinav Soliman (DO)  Internal Medicine  [address] [place] [address]  Phone: [phone]  Fax: [phone]  Established Patient  Follow Up Time: 2 weeks

## 2022-01-20 NOTE — DISCHARGE NOTE PROVIDER - PROVIDER TOKENS
PROVIDER:[TOKEN:[[id number]],FOLLOWUP:[2 weeks]],PROVIDER:[TOKEN:[[id number]],FOLLOWUP:[2 weeks],ESTABLISHEDPATIENT:[T]]

## 2022-01-20 NOTE — DISCHARGE NOTE PROVIDER - NSDCCPCAREPLAN_GEN_ALL_CORE_FT
PRINCIPAL DISCHARGE DIAGNOSIS  Diagnosis: Chest pain  Assessment and Plan of Treatment:       SECONDARY DISCHARGE DIAGNOSES  Diagnosis: Elevated troponin I level  Assessment and Plan of Treatment:      PRINCIPAL DISCHARGE DIAGNOSIS  Diagnosis: Chest pain  Assessment and Plan of Treatment: You had chest pain that was typical of an acute myocardial event. The rise in troponins and certain ekg changes were likely indicative of myocardial stress. You were taken to the cath lab which showed nonobstructive coronary artery disease. There were findings of a structure that could be a fistula. This was further evaluated using a CCTA. 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 An echocardiogram was done which showed normal ejection fraction and a normal right ventricle size. You are no longer having symptoms. You will be treated medically for your coronary disease.   General information below:  Chest pain can be caused by a range of conditions, from not serious to life-threatening. Chest pain can be a symptom of a digestive problem, such as acid reflux or a stomach ulcer. An anxiety attack or a strong emotion, such as anger, can also cause chest pain. Infection, inflammation, or a fracture in the bones or cartilage in your chest can cause pain or discomfort. Sometimes chest pain or pressure is caused by poor blood flow to your heart (angina). Chest pain may also be caused by life-threatening conditions such as a heart attack or blood clot in your lungs.  DISCHARGE INSTRUCTIONS:  Call your local emergency number (911 in the [place]) or have someone call if:  You have any of the following signs of a heart attack:  Squeezing, pressure, or pain in your chest  You may also have any of the following:  Discomfort or pain in your back, neck, jaw, stomach, or arm  Shortness of breath  Nausea or vomiting  Lightheadedness or a sudden cold sweat  Return to the emergency department if:  You have chest discomfort that gets worse, even with medicine.  You cough or vomit blood.  Your bowel movements are black or bloody.  You cannot stop vomiting, or it hurts to swallow.        SECONDARY DISCHARGE DIAGNOSES  Diagnosis: Elevated troponin I level  Assessment and Plan of Treatment: High troponin levels can be a sign of a heart attack or other heart damage. Troponins are muscle proteins, and some are only found in the heart muscle. Damage to the heart causes troponin to be released into the bloodstream. Blood tests check for troponin.   Your troponins were elevated on admission and trending downard. There was excess stress on your heart at some point, likely coinciding with the symptoms of chest pain you had. Possibly some sort of vasospasm in the vessels of the heart caused decreased oxygenation to parts of your heart muscle leading to the increase in troponins. The trend downward is the appropriate direction that we would like to see your troponins to go. Please follow up with your cardiologist to further evaluate and manage your current heart condition.   DISCHARGE INSTRUCTIONS:  Call your local emergency number (911 in the [place]) for any of the following:  You have any of the following signs of a heart attack:  Squeezing, pressure, or pain in your chest  You may also have any of the following:  Discomfort or pain in your back, neck, jaw, stomach, or arm  Shortness of breath  Nausea or vomiting  Lightheadedness or a sudden cold sweat  Return to the emergency department if:  You have known angina and it is happening more often, lasting longer, or causing worse pain.  You have shortness of breath at rest.  Call your doctor or cardiologist if:  You have new or worse swelling in your feet or ankles.

## 2022-01-20 NOTE — DISCHARGE NOTE PROVIDER - NSDCFUADDAPPT_GEN_ALL_CORE_FT
Please call and make an appointment with Dr. Osorio the cardiologist.     Please follow up with your primary care doctor and notify them of your recent hospitalization if they are not already aware.

## 2022-01-20 NOTE — DISCHARGE NOTE PROVIDER - HOSPITAL COURSE
HPI:  51 years old Female with PMHx of Migraines, smoker who presents with chest pain since this morning.    Patient reports that she developed acute chest pain this morning while she was getting dressed for her office. She describes it as left sided,radiating to L jaw, L arm then L upper back.  pressure in nature. She cannot tell any specific provoking factor but pain remained constant, and she called EMS that brought her to hospital. She endorses that she had epigastric pain last night that resolved on its own after an hour. She admits a strong family Hx at paternal side with her father having heart attack at age of 40.    Pt denies fevers, chills, diaphoresis, n/v/d, constipation, SOB. During the interview she denies any active chest pain (on nitro drip).     In ED, her initial VS were grossly normal.   EKG revealed sinus bradycardia ( 51 bpm), with no ST wave changes. Over the day her troponin were elevated to 0.1.  She was loaded with ASA/Plavix and started on Nitro drip for the chest pain and heparin gtt.  She is being admitted for NSTEMI and planned for Cardiac Cath in AM.    (18 Jan 2022 17:40)    Hospital course:  Pt presents with cp with ekg changes (DEQUAN consistent with J-point elevation(early repol)) but did having increasing troponins.  presumptive for NSTEMI, loaded with plavix ASA and placed on heparin gtt prior to cardiac cath. Started on statin 80. BB not indicated as HR low.       Patient with DEQUAN on EKG which were consistent with J-point elevation(early repol) but did having increasing troponins. Treated for NSTEMI and taken for [facility]. 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 CCTA done which showed The distal RPL and the distal left circumflex arteries giving small branches that drain into the coronary sinus, Cannot rule out direct communication. A Small diverticulum  was noted at the inferior wall of the left atrium. Decision is made treat medically with statin and aspirin.     Pt has normal ef, no rt ventricle dilation, is stable for d/c home.

## 2022-01-20 NOTE — DISCHARGE NOTE PROVIDER - NSDCFUADDINST_GEN_ALL_CORE_FT
Cardiac Cath:  finding: non obstructive Coronary artery disease    Cardiac CTA Impression:  *  Calcified plaques at the ostial and mid segments of the LAD resulting   in minimal stenosis.  *  Noncalcified plaque in the mid RCA resulting in mild stenosis.  *  The distal RPL and the distal left circumflex artery give small   branches that drain into the coronary sinus. Cannot rule out direct   communication.  *  Small diverticulum noted at the inferior wall of the left atrium  *  The total Agatston coronary artery calcium score equals 9, which   corresponds to 91th percentile for age, gender and ethnicity.  *  CAD-RADS 2.      Echocardiogram findings:  Summary:   1. LV Ejection Fraction bySimpson's Method with a biplane EF of 63 %.   2. Trace mitral valve regurgitation.   3. Mild tricuspid regurgitation.    PHYSICIAN INTERPRETATION:  Left Ventricle: Normal left ventricular size and wall thicknesses, with   normal systolic and diastolic function.  Right Ventricle: Normal right ventricular size and function.  Pericardium: There is no evidence of pericardial effusion.  Mitral Valve: Structurally normal mitral valve, with normal leaflet   excursion. Trace mitral valve regurgitation isseen.  Tricuspid Valve: Structurally normal tricuspid valve, with normal leaflet   excursion. Mild tricuspid regurgitation is visualized.  Aortic Valve: Normal trileaflet aortic valve with normal opening. No   evidence of aortic valve regurgitation is seen.  Pulmonic Valve: Structurally normal pulmonic valve, with normal leaflet   excursion. No indication of pulmonic valve regurgitation.  Aorta: The aortic root and ascending aorta are structurally normal, with   no evidence of dilitation.  Pulmonary Artery: The main pulmonary artery is normal in size.

## 2022-01-20 NOTE — PROGRESS NOTE ADULT - ATTENDING COMMENTS
51 year old female patient with no significant past cardiac hx presented to hospital for chest pain with EKG changes, trop was positive admitted to CCU and treated as ACS initially underwent cardiac cath earlier today showed non obstructive CAD and suspicion of AV fistula     Plan:  -Will do CCTA   -Continue ASA 81 mg daily   -will hold on plavix and heparin drip   -echo pending .  -transfer to  tele
Patient seen and examined. Pertinent labs, imaging and telemetry reviewed. I agree with the above.     Patient feeling well today. No longer complaining of CP.     51F current smoker presents with chest pain. Patient with DEQUAN on EKG which were consistent with J-point elevation(early repol) but did having increasing troponins. Treated for NSTEMI and taken for LHC. 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 Unclear if contributing to CP and troponin leak.     Chest pain: Resolved.  -S/P LHC as above.   -TTE shows normal EF and no signs of RV dilatation.   -Pending CCTA to better assess anatomy of fistulas.   -May need CTSx consult.   -Since there is no RV dilatation, likely will not need to pursue MR cardiac to assess for shunt flow.     Dispo: Pending CCTA.

## 2022-01-20 NOTE — PROGRESS NOTE ADULT - SUBJECTIVE AND OBJECTIVE BOX
CHIEF COMPLAINT:  Patient is a 51y old  Female who presents with a chief complaint of chest pain (19 Jan 2022 08:20)      INTERVAL HISTORY/OVERNIGHT EVENTS:  1/20  -no acute events overnight, no complaints this AM, no CP, SOB, dizziness  -s/p cath, pending CCTA and TTE today  -started gentle hydration to help prevent RUBIO    ======================  MEDICATIONS:  aspirin enteric coated 81 milliGRAM(s) Oral daily  atorvastatin 80 milliGRAM(s) Oral at bedtime  chlorhexidine 4% Liquid 1 Application(s) Topical <User Schedule>  heparin   Injectable 5000 Unit(s) SubCutaneous every 12 hours  magnesium sulfate  IVPB 2 Gram(s) IV Intermittent every 2 hours    DRIPS:  lactated ringers. 1000 milliLiter(s) (50 mL/Hr) IV Continuous <Continuous>    PRN:       ======================  PHYSICAL EXAMINATION:  GEN:  nad.   HEENT:  eomi. ncat  PULM:  b/l bs.  clear.  no wheezing. no crackles or rales.   CARD: regular. s1, s2.  no murmurs.   ABD: +bs. ntnd  EXT:  no new rashes.  no pitting edema b/l .   NEURO:  moves all extremities, awake alert  ======================  OBJECTIVE:        VS:  T(F): 97 (01-20 @ 04:43), Max: 97.2 (01-19 @ 17:02)  HR: 56 (01-20 @ 04:43) (51 - 67)  BP: 108/52 (01-20 @ 04:43) (108/52 - 150/69)  RR: 18 (01-20 @ 04:43) (18 - 29)  SpO2: 97% (01-19 @ 15:00) (96% - 100%)  CVP(mm Hg): --  CO: --  CI: --  PA: --  PCWP: --    I/O:      01-18 @ 07:01  -  01-19 @ 07:00  --------------------------------------------------------  IN: 94 mL / OUT: 500 mL / NET: -406 mL    01-19 @ 07:01  -  01-20 @ 07:00  --------------------------------------------------------  IN: 2095 mL / OUT: 700 mL / NET: 1395 mL        Weight trend:  Weight (kg): 63.4 (01-20)    ======================    LABS:                          10.4   5.49  )-----------( 265      ( 20 Jan 2022 07:00 )             31.8     01-20    138  |  107  |  9<L>  ----------------------------<  96  4.0   |  18  |  0.6<L>    Ca    8.7      20 Jan 2022 07:00  Mg     1.7     01-20    TPro  5.9<L>  /  Alb  3.7  /  TBili  <0.2  /  DBili  x   /  AST  17  /  ALT  9   /  AlkPhos  37  01-20    LIVER FUNCTIONS - ( 20 Jan 2022 07:00 )  Alb: 3.7 g/dL / Pro: 5.9 g/dL / ALK PHOS: 37 U/L / ALT: 9 U/L / AST: 17 U/L / GGT: x           PT/INR - ( 20 Jan 2022 07:00 )   PT: 11.50 sec;   INR: 1.00 ratio         PTT - ( 20 Jan 2022 07:00 )  PTT:30.2 sec    CARDIAC MARKERS ( 19 Jan 2022 05:30 )  x     / 0.16 ng/mL / 163 U/L / x     / 7.2 ng/mL  CARDIAC MARKERS ( 19 Jan 2022 01:00 )  x     / 0.28 ng/mL / x     / x     / x      CARDIAC MARKERS ( 18 Jan 2022 13:16 )  x     / 0.10 ng/mL / x     / x     / x          Serum Pro-Brain Natriuretic Peptide: 47 pg/mL (01-18)        Cultures:

## 2022-01-20 NOTE — DISCHARGE NOTE PROVIDER - NSDCMRMEDTOKEN_GEN_ALL_CORE_FT
Fioricet oral capsule:    Adult Aspirin Regimen 81 mg oral delayed release tablet: 1 tab(s) orally once a day  Fioricet oral capsule:   Lipitor 80 mg oral tablet: 1 tab(s) orally once a day (at bedtime)

## 2022-01-20 NOTE — DISCHARGE NOTE NURSING/CASE MANAGEMENT/SOCIAL WORK - PATIENT PORTAL LINK FT
You can access the FollowMyHealth Patient Portal offered by [facility] by registering at the following website: [email or web address]. By joining [employer]’s FollowMyHealth portal, you will also be able to view your health information using other applications (apps) compatible with our system.

## 2022-01-20 NOTE — DISCHARGE NOTE NURSING/CASE MANAGEMENT/SOCIAL WORK - NSDCPEFALRISK_GEN_ALL_CORE
For information on Fall & Injury Prevention, visit: [email or web address] OR  [email or web address] OR  https://www.cdc.gov/steadi/patient.html

## 2022-01-20 NOTE — DISCHARGE NOTE PROVIDER - ATTENDING DISCHARGE PHYSICAL EXAMINATION:
Patient seen and examined. Pertinent labs, imaging and telemetry reviewed. I agree with the above.     Patient no longer with CP. She feels well today. S/P TTE with normal EF and CCTA which showed small communication between RPDA and LCx to coronary sinus. No dilatation seen in artery or vein. No need for intervention. Patient is stable for discharge home with outpatient follow up.     VSS  RRR. S1S2 present. No m/r/g.  CTA B/L.   Right radial pulse 2+ with no hematoma seen.

## 2022-01-28 NOTE — CDI QUERY NOTE - NSCDIOTHERTXTBX_GEN_ALL_CORE_HH
___________________________________________________________________________________________________________________________________    51 F with Diagnosis of Chest Pain    Cardiac Cath: 1/20/2022:    CORONARY CIRCULATION: The coronary circulation is right dominant. Otherwise normal coronary arteries. Left main: Angiography showed no evidence of disease. LAD: Angiography showed no evidence of disease. 1st diagonal:  Angiography showed mild atherosclerosis with no flow limiting lesions. Proximal circumflex: Angiography showed minor luminal irregularities with no flow limiting lesions. Distal circumflex: Angiography showed no evidence of disease. 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 1st obtuse marginal: The vessel was large sized. Angiography showed no evidence of disease. RCA: Angiography showed no evidence of disease. Right PDA: Angiography showed no evidence of disease. Right posterolateral segment: Angiography showed no evidence of disease. 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   IMPRESSIONS: There is no significant coronary artery disease.    Documentation:   1/20/2022:  Discharge Note: Attending: Treated for NSTEMI and taken for [facility]. 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  Diagnosis: Chest pain  You had chest pain that was typical of an acute myocardial event. The rise in troponins and certain ekg changes were likely indicative of myocardial stress. You were taken to the cath lab which showed nonobstructive coronary artery disease. There were findings of a structure that could be a fistula. This was further evaluated using a CCTA. 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 An echocardiogram was done which showed normal ejection fraction and a normal right ventricle size.    Based on your professional judgment and clinical indicators, can the diagnosis of NSTEMI be further clarified as:    []  NSTEMI after study was ruled out  [] NSTEMI ruled in was evaluated and treated  [] Other (please specify)  [] Clinically unable to determine    Thank you.

## 2022-02-01 DIAGNOSIS — Q24.5 MALFORMATION OF CORONARY VESSELS: ICD-10-CM

## 2022-02-01 DIAGNOSIS — R07.9 CHEST PAIN, UNSPECIFIED: ICD-10-CM

## 2022-02-01 DIAGNOSIS — R00.1 BRADYCARDIA, UNSPECIFIED: ICD-10-CM

## 2022-02-01 DIAGNOSIS — I25.10 ATHEROSCLEROTIC HEART DISEASE OF NATIVE CORONARY ARTERY WITHOUT ANGINA PECTORIS: ICD-10-CM

## 2022-02-01 DIAGNOSIS — F17.210 NICOTINE DEPENDENCE, CIGARETTES, UNCOMPLICATED: ICD-10-CM

## 2022-02-01 DIAGNOSIS — I21.4 NON-ST ELEVATION (NSTEMI) MYOCARDIAL INFARCTION: ICD-10-CM

## 2022-02-01 DIAGNOSIS — G43.909 MIGRAINE, UNSPECIFIED, NOT INTRACTABLE, WITHOUT STATUS MIGRAINOSUS: ICD-10-CM

## 2022-02-01 DIAGNOSIS — R77.8 OTHER SPECIFIED ABNORMALITIES OF PLASMA PROTEINS: ICD-10-CM

## 2022-02-03 ENCOUNTER — APPOINTMENT (OUTPATIENT)
Dept: CARDIOLOGY | Facility: CLINIC | Age: 52
End: 2022-02-03
Payer: COMMERCIAL

## 2022-02-03 VITALS — BODY MASS INDEX: 23.92 KG/M2 | TEMPERATURE: 97.6 F | WEIGHT: 135 LBS | HEIGHT: 63 IN

## 2022-02-03 VITALS — DIASTOLIC BLOOD PRESSURE: 80 MMHG | SYSTOLIC BLOOD PRESSURE: 116 MMHG | HEART RATE: 76 BPM

## 2022-02-03 PROCEDURE — 93000 ELECTROCARDIOGRAM COMPLETE: CPT

## 2022-02-03 PROCEDURE — 99212 OFFICE O/P EST SF 10 MIN: CPT

## 2022-02-03 RX ORDER — BUTALB/ACETAMINOPHEN/CAFFEINE 50-325-40
TABLET ORAL
Refills: 0 | Status: ACTIVE | COMMUNITY

## 2022-02-03 RX ORDER — METRONIDAZOLE 7.5 MG/G
0.75 GEL VAGINAL
Qty: 1 | Refills: 2 | Status: DISCONTINUED | COMMUNITY
Start: 2020-08-18 | End: 2022-02-03

## 2022-02-03 RX ORDER — ATORVASTATIN CALCIUM 80 MG/1
80 TABLET, FILM COATED ORAL
Refills: 0 | Status: ACTIVE | COMMUNITY

## 2022-02-03 RX ORDER — SULFAMETHOXAZOLE AND TRIMETHOPRIM 800; 160 MG/1; MG/1
800-160 TABLET ORAL TWICE DAILY
Qty: 14 | Refills: 0 | Status: DISCONTINUED | COMMUNITY
Start: 2020-08-08 | End: 2022-02-03

## 2022-02-03 RX ORDER — ASPIRIN ENTERIC COATED TABLETS 81 MG 81 MG/1
81 TABLET, DELAYED RELEASE ORAL DAILY
Qty: 90 | Refills: 3 | Status: ACTIVE | COMMUNITY
Start: 2022-02-03 | End: 1900-01-01

## 2022-02-03 NOTE — ASSESSMENT
[FreeTextEntry1] : CAD: Pt with mild CAD on cath and CCTA. Given NSTEMI, likely due to microvascular dysfunction. \par -Continue with ASA 81mg PO daily and atorvastatin 80mg PO daily.\par -Will have repeat lab work with PMD in 6 months to check lipids. \par -Unable to start betablocker due to borderline HR. 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 \par \par HLD: \par -Continue with atorvastatin 80mg PO daily. \par \par Follow up in 6 months

## 2022-02-03 NOTE — HISTORY OF PRESENT ILLNESS
[FreeTextEntry1] : Ms. Modi is a 50 yo F with PMHx of migraines, HTN, HLD and non-obstructive CAD who presents for post-hospital follow up. Her PMD is Dr. Abhinav Soliman. She was recently hospitalized at [facility] on 01/18/22 for chest pain. She presented to [facility] with CP and found to have elevated troponins. Ruled in for NSTEMI and sent to [facility] for cardiac catheterization. LHC showed no obstructive CAD but possible communication between RPDA/LCx and right atrium. She underwent CCTA which showed small communications to coronary sinus. Patient was discharged with outpatient follow up for non-obstructive CAD. She has been feeling well since discharge.

## 2022-02-03 NOTE — REASON FOR VISIT
[Other: ____] : [unfilled] [FreeTextEntry1] : Diagnostic Tests:\par --------------------\par EKG:\par 02/03/22-NSR. TWI in leads II, III, aVF\par 01/18/22-NSR with J-point elevation. \par --------------------\par Echo:\par 01/20/22-TTE: EF 63%. Trace MR, mild TR. \par --------------------\par Cath:\par 01/19/22-LHC: LM normal, LAD normal, D1 mild, LCx luminal with fistula to right atrium, RCA normal, PDA with fistula to right atrium. \par -------------------\par CT:\par 01/20/22-CCTA: CAC 9 (91%ile LAD). Calcified plaque at ostial and mid LAD with minimal stenosis. Noncalcified plaque mRCA with mild stenosis. Distal RPL and distal LCx with small branches draining into coronary sinus. Small diverticulum in inferior wall of LA.

## 2022-04-25 NOTE — DISCHARGE NOTE PROVIDER - DISCHARGE DIET
DASH Diet/Low Sodium Diet Custom Shielding Afterword Text Will Not Be Included With Simple Simulations (X X Y Cm............): port to correlate with the lesion size, including treatment margin. The custom lead shield is adequate to accommodate the appropriate applicator and provide adequate shielding around the treatment site. Additional shielding (as noted below) is used to protect sensitive, normal tissues.

## 2022-08-03 ENCOUNTER — APPOINTMENT (OUTPATIENT)
Dept: CARDIOLOGY | Facility: CLINIC | Age: 52
End: 2022-08-03

## 2023-02-16 ENCOUNTER — APPOINTMENT (OUTPATIENT)
Dept: CARDIOLOGY | Facility: CLINIC | Age: 53
End: 2023-02-16
Payer: COMMERCIAL

## 2023-02-16 VITALS — HEIGHT: 63 IN | WEIGHT: 152 LBS | BODY MASS INDEX: 26.93 KG/M2 | TEMPERATURE: 97.6 F

## 2023-02-16 VITALS — HEART RATE: 58 BPM | DIASTOLIC BLOOD PRESSURE: 68 MMHG | SYSTOLIC BLOOD PRESSURE: 118 MMHG

## 2023-02-16 DIAGNOSIS — I25.10 ATHEROSCLEROTIC HEART DISEASE OF NATIVE CORONARY ARTERY W/OUT ANGINA PECTORIS: ICD-10-CM

## 2023-02-16 DIAGNOSIS — G43.909 MIGRAINE, UNSPECIFIED, NOT INTRACTABLE, W/OUT STATUS MIGRAINOSUS: ICD-10-CM

## 2023-02-16 DIAGNOSIS — Q24.5 MALFORMATION OF CORONARY VESSELS: ICD-10-CM

## 2023-02-16 DIAGNOSIS — R74.8 ABNORMAL LEVELS OF OTHER SERUM ENZYMES: ICD-10-CM

## 2023-02-16 DIAGNOSIS — E78.5 HYPERLIPIDEMIA, UNSPECIFIED: ICD-10-CM

## 2023-02-16 PROCEDURE — 93000 ELECTROCARDIOGRAM COMPLETE: CPT

## 2023-02-16 PROCEDURE — 99213 OFFICE O/P EST LOW 20 MIN: CPT

## 2023-02-16 PROCEDURE — 99072 ADDL SUPL MATRL&STAF TM PHE: CPT

## 2023-02-16 RX ORDER — CHOLECALCIFEROL (VITAMIN D3) 1250 MCG
1.25 MG CAPSULE ORAL
Refills: 0 | Status: ACTIVE | COMMUNITY

## 2023-02-16 RX ORDER — FERROUS SULFATE 325(65) MG
325 (65 FE) TABLET ORAL
Refills: 0 | Status: ACTIVE | COMMUNITY

## 2023-02-16 NOTE — REASON FOR VISIT
[FreeTextEntry1] : Diagnostic Tests:\par --------------------\par EKG:\par 02/16/23-Sinus bradycardia at 58 bpm. \par 02/03/22-NSR. TWI in leads II, III, aVF\par 01/18/22-NSR with J-point elevation. \par --------------------\par Echo:\par 01/20/22-TTE: EF 63%. Trace MR, mild TR. \par --------------------\par Cath:\par 01/19/22-LHC: LM normal, LAD normal, D1 mild, LCx luminal with fistula to right atrium, RCA normal, PDA with fistula to right atrium. \par -------------------\par CT:\par 01/20/22-CCTA: CAC 9 (91%ile LAD). Calcified plaque at ostial and mid LAD with minimal stenosis. Noncalcified plaque mRCA with mild stenosis. Distal RPL and distal LCx with small branches draining into coronary sinus. Small diverticulum in inferior wall of LA.

## 2023-02-16 NOTE — HISTORY OF PRESENT ILLNESS
[FreeTextEntry1] : Ms. Modi is a 50 yo F with PMHx of migraines, HTN, HLD and non-obstructive CAD who presents follow up. Her PMD is Dr. Abhinav Soliman. She was recently hospitalized at [facility] on 01/18/22 for chest pain. She presented to [facility] with CP and found to have elevated troponins. Ruled in for NSTEMI and sent to [facility] for cardiac catheterization. LHC showed no obstructive CAD but possible communication between RPDA/LCx and right atrium. She underwent CCTA which showed small communications to coronary sinus. Patient was discharged with outpatient follow up for non-obstructive CAD. She has been feeling well since discharge. \par 02/16/23-She has intermittent CP. It occurs at rest, usually not with exertion. She had labwork which showed mildly elevated CK. She gets some intermittent muscle pain.

## 2023-02-16 NOTE — ASSESSMENT
[FreeTextEntry1] : CAD: Pt with mild CAD on cath and CCTA. Given NSTEMI, likely due to microvascular dysfunction. \par -Continue with ASA 81mg PO daily and atorvastatin 80mg PO daily.\par -Decrease atorvastatin 80mg to 40mg PO daily. \par -Check labs in 4-6 weeks. \par -Unable to start betablocker due to borderline HR. \par \par Elevated CK: Possibly due to statin\par -Decrease statin dose. \par -Recheck labs. 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 \par \par HLD: LDL <70\par -Continue with atorvastatin 40mg PO daily. \par \par Follow up in 4 months

## 2023-04-14 ENCOUNTER — APPOINTMENT (OUTPATIENT)
Dept: OBGYN | Facility: CLINIC | Age: 53
End: 2023-04-14
Payer: COMMERCIAL

## 2023-04-14 VITALS
BODY MASS INDEX: 26.4 KG/M2 | DIASTOLIC BLOOD PRESSURE: 90 MMHG | HEIGHT: 63 IN | WEIGHT: 149 LBS | SYSTOLIC BLOOD PRESSURE: 110 MMHG

## 2023-04-14 DIAGNOSIS — Z01.419 ENCOUNTER FOR GYNECOLOGICAL EXAMINATION (GENERAL) (ROUTINE) W/OUT ABNORMAL FINDINGS: ICD-10-CM

## 2023-04-14 PROCEDURE — 99396 PREV VISIT EST AGE 40-64: CPT

## 2023-04-14 PROCEDURE — 99072 ADDL SUPL MATRL&STAF TM PHE: CPT

## 2023-04-18 LAB
CYTOLOGY CVX/VAG DOC THIN PREP: ABNORMAL
HPV HIGH+LOW RISK DNA PNL CVX: NOT DETECTED

## 2023-06-15 ENCOUNTER — APPOINTMENT (OUTPATIENT)
Dept: CARDIOLOGY | Facility: CLINIC | Age: 53
End: 2023-06-15

## 2023-06-15 NOTE — PHYSICAL EXAM
[Well Developed] : well developed [Well Nourished] : well nourished [No Acute Distress] : no acute distress [Normal Conjunctiva] : normal conjunctiva [Normal Venous Pressure] : normal venous pressure [5th Left ICS - MCL] : palpated at the 5th LICS in the midclavicular line [Normal] : normal [No Precordial Heave] : no precordial heave was noted [Normal Rate] : normal [Rhythm Regular] : regular [Normal S1] : normal S1 [Normal S2] : normal S2 [No Murmur] : no murmurs heard [No Pitting Edema] : no pitting edema present [2+] : left 2+ [Clear Lung Fields] : clear lung fields [Good Air Entry] : good air entry [No Respiratory Distress] : no respiratory distress  [Soft] : abdomen soft [Non Tender] : non-tender [Normal Bowel Sounds] : normal bowel sounds [Normal Gait] : normal gait [No Edema] : no edema [No Varicosities] : no varicosities [Moves all extremities] : moves all extremities [No Rash] : no rash [No Focal Deficits] : no focal deficits [Alert and Oriented] : alert and oriented

## 2023-06-15 NOTE — REASON FOR VISIT
[Other: ____] : [unfilled] [FreeTextEntry1] : Diagnostic Tests:\par --------------------\par EKG:\par 02/16/23-Sinus bradycardia at 58 bpm. \par 02/03/22-NSR. TWI in leads II, III, aVF\par 01/18/22-NSR with J-point elevation. \par --------------------\par Echo:\par 01/20/22-TTE: EF 63%. Trace MR, mild TR. \par --------------------\par Cath:\par 01/19/22-LHC: LM normal, LAD normal, D1 mild, LCx luminal with fistula to right atrium, RCA normal, PDA with fistula to right atrium. \par -------------------\par CT:\par 01/20/22-CCTA: CAC 9 (91%ile LAD). Calcified plaque at ostial and mid LAD with minimal stenosis. Noncalcified plaque mRCA with mild stenosis. Distal RPL and distal LCx with small branches draining into coronary sinus. Small diverticulum in inferior wall of LA.

## 2023-06-15 NOTE — HISTORY OF PRESENT ILLNESS
[FreeTextEntry1] : Ms. Modi is a 53yo F with PMHx of migraines, HTN, HLD and non-obstructive CAD who presents follow up. Her PMD is Dr. Abhinav Soliman. She was recently hospitalized at [facility] on 01/18/22 for chest pain. She presented to [facility] with CP and found to have elevated troponins. Ruled in for NSTEMI and sent to [facility] for cardiac catheterization. LHC showed no obstructive CAD but possible communication between RPDA/LCx and right atrium. She underwent CCTA which showed small communications to coronary sinus. Patient was discharged with outpatient follow up for non-obstructive CAD. She has been feeling well since discharge. \par 02/16/23-She has intermittent CP. It occurs at rest, usually not with exertion. She had labwork which showed mildly elevated CK. She gets some intermittent muscle pain.

## 2023-12-26 DIAGNOSIS — R39.9 UNSPECIFIED SYMPTOMS AND SIGNS INVOLVING THE GENITOURINARY SYSTEM: ICD-10-CM

## 2023-12-26 RX ORDER — SULFAMETHOXAZOLE AND TRIMETHOPRIM 800; 160 MG/1; MG/1
800-160 TABLET ORAL TWICE DAILY
Qty: 14 | Refills: 1 | Status: ACTIVE | COMMUNITY
Start: 2023-12-26 | End: 1900-01-01

## 2024-01-23 RX ORDER — NITROFURANTOIN (MONOHYDRATE/MACROCRYSTALS) 25; 75 MG/1; MG/1
100 CAPSULE ORAL
Qty: 14 | Refills: 0 | Status: ACTIVE | COMMUNITY
Start: 2024-01-23 | End: 1900-01-01

## 2024-01-24 LAB — BACTERIA UR CULT: ABNORMAL

## 2024-04-01 ENCOUNTER — RX RENEWAL (OUTPATIENT)
Age: 54
End: 2024-04-01

## 2024-04-01 RX ORDER — ATORVASTATIN CALCIUM 40 MG/1
40 TABLET, FILM COATED ORAL
Qty: 90 | Refills: 0 | Status: ACTIVE | COMMUNITY
Start: 2022-02-03 | End: 1900-01-01

## 2024-07-11 ENCOUNTER — APPOINTMENT (OUTPATIENT)
Dept: CARDIOLOGY | Facility: CLINIC | Age: 54
End: 2024-07-11

## 2024-07-11 VITALS
SYSTOLIC BLOOD PRESSURE: 120 MMHG | DIASTOLIC BLOOD PRESSURE: 80 MMHG | BODY MASS INDEX: 27.11 KG/M2 | HEART RATE: 62 BPM | OXYGEN SATURATION: 98 % | HEIGHT: 63 IN | WEIGHT: 153 LBS

## 2024-07-11 DIAGNOSIS — R74.8 ABNORMAL LEVELS OF OTHER SERUM ENZYMES: ICD-10-CM

## 2024-07-11 DIAGNOSIS — Q24.5 MALFORMATION OF CORONARY VESSELS: ICD-10-CM

## 2024-07-11 DIAGNOSIS — E78.5 HYPERLIPIDEMIA, UNSPECIFIED: ICD-10-CM

## 2024-07-11 DIAGNOSIS — I25.10 ATHEROSCLEROTIC HEART DISEASE OF NATIVE CORONARY ARTERY W/OUT ANGINA PECTORIS: ICD-10-CM

## 2024-07-11 PROCEDURE — 93000 ELECTROCARDIOGRAM COMPLETE: CPT

## 2024-07-11 PROCEDURE — G2211 COMPLEX E/M VISIT ADD ON: CPT

## 2024-07-11 PROCEDURE — 99214 OFFICE O/P EST MOD 30 MIN: CPT

## 2024-10-25 ENCOUNTER — RX RENEWAL (OUTPATIENT)
Age: 54
End: 2024-10-25

## 2024-12-13 ENCOUNTER — APPOINTMENT (OUTPATIENT)
Dept: CARDIOLOGY | Facility: CLINIC | Age: 54
End: 2024-12-13

## 2025-01-16 ENCOUNTER — APPOINTMENT (OUTPATIENT)
Dept: CARDIOLOGY | Facility: CLINIC | Age: 55
End: 2025-01-16

## 2025-05-09 ENCOUNTER — RX RENEWAL (OUTPATIENT)
Age: 55
End: 2025-05-09